# Patient Record
Sex: FEMALE | Race: BLACK OR AFRICAN AMERICAN | Employment: OTHER | URBAN - METROPOLITAN AREA
[De-identification: names, ages, dates, MRNs, and addresses within clinical notes are randomized per-mention and may not be internally consistent; named-entity substitution may affect disease eponyms.]

---

## 2017-01-19 ENCOUNTER — OFFICE VISIT (OUTPATIENT)
Dept: INTERNAL MEDICINE | Age: 77
End: 2017-01-19

## 2017-01-19 VITALS
DIASTOLIC BLOOD PRESSURE: 84 MMHG | BODY MASS INDEX: 35.26 KG/M2 | HEIGHT: 63 IN | WEIGHT: 199 LBS | SYSTOLIC BLOOD PRESSURE: 136 MMHG

## 2017-01-19 DIAGNOSIS — K21.00 REFLUX ESOPHAGITIS: ICD-10-CM

## 2017-01-19 DIAGNOSIS — I10 ESSENTIAL HYPERTENSION: ICD-10-CM

## 2017-01-19 DIAGNOSIS — E11.9 TYPE 2 DIABETES MELLITUS WITHOUT COMPLICATION, WITHOUT LONG-TERM CURRENT USE OF INSULIN (HCC): Primary | ICD-10-CM

## 2017-01-19 DIAGNOSIS — E78.00 PURE HYPERCHOLESTEROLEMIA: ICD-10-CM

## 2017-01-19 PROCEDURE — 99213 OFFICE O/P EST LOW 20 MIN: CPT | Performed by: INTERNAL MEDICINE

## 2017-01-19 RX ORDER — METFORMIN HYDROCHLORIDE 500 MG/1
TABLET, EXTENDED RELEASE ORAL
Qty: 60 TABLET | Refills: 5 | Status: SHIPPED | OUTPATIENT
Start: 2017-01-19 | End: 2018-01-26 | Stop reason: SDUPTHER

## 2017-01-20 LAB
A/G RATIO: 1.7 (ref 1.1–2.2)
ALBUMIN SERPL-MCNC: 4.4 G/DL (ref 3.4–5)
ALP BLD-CCNC: 102 U/L (ref 40–129)
ALT SERPL-CCNC: 12 U/L (ref 10–40)
ANION GAP SERPL CALCULATED.3IONS-SCNC: 13 MMOL/L (ref 3–16)
AST SERPL-CCNC: 12 U/L (ref 15–37)
BASOPHILS ABSOLUTE: 0 K/UL (ref 0–0.2)
BASOPHILS RELATIVE PERCENT: 0.3 %
BILIRUB SERPL-MCNC: 0.4 MG/DL (ref 0–1)
BUN BLDV-MCNC: 17 MG/DL (ref 7–20)
CALCIUM SERPL-MCNC: 10.2 MG/DL (ref 8.3–10.6)
CHLORIDE BLD-SCNC: 101 MMOL/L (ref 99–110)
CHOLESTEROL, TOTAL: 178 MG/DL (ref 0–199)
CO2: 31 MMOL/L (ref 21–32)
CREAT SERPL-MCNC: 0.7 MG/DL (ref 0.6–1.2)
EOSINOPHILS ABSOLUTE: 0.2 K/UL (ref 0–0.6)
EOSINOPHILS RELATIVE PERCENT: 3 %
GFR AFRICAN AMERICAN: >60
GFR NON-AFRICAN AMERICAN: >60
GLOBULIN: 2.6 G/DL
GLUCOSE BLD-MCNC: 140 MG/DL (ref 70–99)
HCT VFR BLD CALC: 43.2 % (ref 36–48)
HDLC SERPL-MCNC: 71 MG/DL (ref 40–60)
HEMOGLOBIN: 14.1 G/DL (ref 12–16)
LDL CHOLESTEROL CALCULATED: 96 MG/DL
LYMPHOCYTES ABSOLUTE: 2.2 K/UL (ref 1–5.1)
LYMPHOCYTES RELATIVE PERCENT: 33.6 %
MCH RBC QN AUTO: 30.2 PG (ref 26–34)
MCHC RBC AUTO-ENTMCNC: 32.7 G/DL (ref 31–36)
MCV RBC AUTO: 92.4 FL (ref 80–100)
MONOCYTES ABSOLUTE: 0.6 K/UL (ref 0–1.3)
MONOCYTES RELATIVE PERCENT: 8.9 %
NEUTROPHILS ABSOLUTE: 3.6 K/UL (ref 1.7–7.7)
NEUTROPHILS RELATIVE PERCENT: 54.2 %
PDW BLD-RTO: 13.7 % (ref 12.4–15.4)
PLATELET # BLD: 242 K/UL (ref 135–450)
PMV BLD AUTO: 9 FL (ref 5–10.5)
POTASSIUM SERPL-SCNC: 4 MMOL/L (ref 3.5–5.1)
RBC # BLD: 4.68 M/UL (ref 4–5.2)
SODIUM BLD-SCNC: 145 MMOL/L (ref 136–145)
TOTAL PROTEIN: 7 G/DL (ref 6.4–8.2)
TRIGL SERPL-MCNC: 53 MG/DL (ref 0–150)
VLDLC SERPL CALC-MCNC: 11 MG/DL
WBC # BLD: 6.6 K/UL (ref 4–11)

## 2017-01-21 LAB
ESTIMATED AVERAGE GLUCOSE: 159.9 MG/DL
HBA1C MFR BLD: 7.2 %

## 2017-03-07 RX ORDER — AMLODIPINE VALSARTAN AND HYDROCHLOROTHIAZIDE 10; 320; 25 MG/1; MG/1; MG/1
TABLET, FILM COATED ORAL
Qty: 30 TABLET | Refills: 4 | Status: SHIPPED | OUTPATIENT
Start: 2017-03-07 | End: 2017-04-28 | Stop reason: SDUPTHER

## 2017-04-28 ENCOUNTER — OFFICE VISIT (OUTPATIENT)
Dept: INTERNAL MEDICINE | Age: 77
End: 2017-04-28

## 2017-04-28 VITALS
DIASTOLIC BLOOD PRESSURE: 88 MMHG | BODY MASS INDEX: 34.38 KG/M2 | WEIGHT: 194 LBS | SYSTOLIC BLOOD PRESSURE: 122 MMHG | HEIGHT: 63 IN

## 2017-04-28 DIAGNOSIS — I10 ESSENTIAL HYPERTENSION: ICD-10-CM

## 2017-04-28 DIAGNOSIS — J30.1 SEASONAL ALLERGIC RHINITIS DUE TO POLLEN: ICD-10-CM

## 2017-04-28 DIAGNOSIS — E11.9 TYPE 2 DIABETES MELLITUS WITHOUT COMPLICATION, WITHOUT LONG-TERM CURRENT USE OF INSULIN (HCC): ICD-10-CM

## 2017-04-28 DIAGNOSIS — Z01.818 PRE-OP EXAM: Primary | ICD-10-CM

## 2017-04-28 PROCEDURE — 93000 ELECTROCARDIOGRAM COMPLETE: CPT | Performed by: HOSPITALIST

## 2017-04-28 PROCEDURE — 99213 OFFICE O/P EST LOW 20 MIN: CPT | Performed by: HOSPITALIST

## 2017-04-28 RX ORDER — MULTIVIT-MIN/IRON/FOLIC ACID/K 18-600-40
1 CAPSULE ORAL DAILY
COMMUNITY
End: 2022-10-21 | Stop reason: SDUPTHER

## 2017-04-28 ASSESSMENT — ENCOUNTER SYMPTOMS
GASTROINTESTINAL NEGATIVE: 1
RESPIRATORY NEGATIVE: 1
BLURRED VISION: 1

## 2017-05-30 ENCOUNTER — TELEPHONE (OUTPATIENT)
Dept: INTERNAL MEDICINE | Age: 77
End: 2017-05-30

## 2017-05-30 RX ORDER — AMLODIPINE, VALSARTAN AND HYDROCHLOROTHIAZIDE 10; 320; 25 MG/1; MG/1; MG/1
TABLET ORAL
Qty: 7 TABLET | Refills: 0 | Status: SHIPPED | OUTPATIENT
Start: 2017-05-30 | End: 2017-06-06 | Stop reason: SDUPTHER

## 2017-06-02 ENCOUNTER — TELEPHONE (OUTPATIENT)
Dept: INTERNAL MEDICINE | Age: 77
End: 2017-06-02

## 2017-06-06 ENCOUNTER — TELEPHONE (OUTPATIENT)
Dept: INTERNAL MEDICINE | Age: 77
End: 2017-06-06

## 2017-06-06 RX ORDER — AMLODIPINE, VALSARTAN AND HYDROCHLOROTHIAZIDE 10; 320; 25 MG/1; MG/1; MG/1
TABLET ORAL
Qty: 7 TABLET | Refills: 0 | Status: SHIPPED | OUTPATIENT
Start: 2017-06-06 | End: 2017-06-16 | Stop reason: SDUPTHER

## 2017-06-14 ENCOUNTER — TELEPHONE (OUTPATIENT)
Dept: INTERNAL MEDICINE | Age: 77
End: 2017-06-14

## 2017-06-15 ENCOUNTER — TELEPHONE (OUTPATIENT)
Dept: INTERNAL MEDICINE | Age: 77
End: 2017-06-15

## 2017-06-16 RX ORDER — AMLODIPINE, VALSARTAN AND HYDROCHLOROTHIAZIDE 10; 320; 25 MG/1; MG/1; MG/1
TABLET ORAL
Qty: 30 TABLET | Refills: 0 | Status: SHIPPED | OUTPATIENT
Start: 2017-06-16 | End: 2017-11-07 | Stop reason: SDUPTHER

## 2017-06-19 ENCOUNTER — TELEPHONE (OUTPATIENT)
Dept: INTERNAL MEDICINE | Age: 77
End: 2017-06-19

## 2017-06-19 RX ORDER — AMLODIPINE, VALSARTAN AND HYDROCHLOROTHIAZIDE 10; 320; 25 MG/1; MG/1; MG/1
TABLET ORAL
Qty: 30 TABLET | Refills: 0 | Status: CANCELLED | OUTPATIENT
Start: 2017-06-19

## 2017-07-12 ENCOUNTER — OFFICE VISIT (OUTPATIENT)
Dept: INTERNAL MEDICINE | Age: 77
End: 2017-07-12

## 2017-07-12 VITALS
SYSTOLIC BLOOD PRESSURE: 130 MMHG | HEIGHT: 63 IN | DIASTOLIC BLOOD PRESSURE: 82 MMHG | BODY MASS INDEX: 34.91 KG/M2 | WEIGHT: 197 LBS

## 2017-07-12 DIAGNOSIS — E11.9 TYPE 2 DIABETES MELLITUS WITHOUT COMPLICATION, WITHOUT LONG-TERM CURRENT USE OF INSULIN (HCC): Primary | ICD-10-CM

## 2017-07-12 DIAGNOSIS — E78.00 PURE HYPERCHOLESTEROLEMIA: ICD-10-CM

## 2017-07-12 DIAGNOSIS — I10 ESSENTIAL HYPERTENSION: ICD-10-CM

## 2017-07-12 PROCEDURE — 99213 OFFICE O/P EST LOW 20 MIN: CPT | Performed by: INTERNAL MEDICINE

## 2017-07-17 DIAGNOSIS — I10 ESSENTIAL HYPERTENSION: ICD-10-CM

## 2017-07-17 DIAGNOSIS — E78.00 PURE HYPERCHOLESTEROLEMIA: ICD-10-CM

## 2017-07-17 LAB
A/G RATIO: 1.4 (ref 1.1–2.2)
ALBUMIN SERPL-MCNC: 4.1 G/DL (ref 3.4–5)
ALP BLD-CCNC: 100 U/L (ref 40–129)
ALT SERPL-CCNC: 12 U/L (ref 10–40)
ANION GAP SERPL CALCULATED.3IONS-SCNC: 11 MMOL/L (ref 3–16)
AST SERPL-CCNC: 14 U/L (ref 15–37)
BASOPHILS ABSOLUTE: 0 K/UL (ref 0–0.2)
BASOPHILS RELATIVE PERCENT: 0.6 %
BILIRUB SERPL-MCNC: 0.4 MG/DL (ref 0–1)
BUN BLDV-MCNC: 16 MG/DL (ref 7–20)
CALCIUM SERPL-MCNC: 10.2 MG/DL (ref 8.3–10.6)
CHLORIDE BLD-SCNC: 98 MMOL/L (ref 99–110)
CHOLESTEROL, TOTAL: 215 MG/DL (ref 0–199)
CO2: 31 MMOL/L (ref 21–32)
CREAT SERPL-MCNC: 0.6 MG/DL (ref 0.6–1.2)
EOSINOPHILS ABSOLUTE: 0.2 K/UL (ref 0–0.6)
EOSINOPHILS RELATIVE PERCENT: 3.1 %
GFR AFRICAN AMERICAN: >60
GFR NON-AFRICAN AMERICAN: >60
GLOBULIN: 2.9 G/DL
GLUCOSE BLD-MCNC: 121 MG/DL (ref 70–99)
HCT VFR BLD CALC: 43.2 % (ref 36–48)
HDLC SERPL-MCNC: 77 MG/DL (ref 40–60)
HEMOGLOBIN: 14.2 G/DL (ref 12–16)
LDL CHOLESTEROL CALCULATED: 125 MG/DL
LYMPHOCYTES ABSOLUTE: 2.4 K/UL (ref 1–5.1)
LYMPHOCYTES RELATIVE PERCENT: 42.6 %
MCH RBC QN AUTO: 31 PG (ref 26–34)
MCHC RBC AUTO-ENTMCNC: 32.9 G/DL (ref 31–36)
MCV RBC AUTO: 94.4 FL (ref 80–100)
MONOCYTES ABSOLUTE: 0.5 K/UL (ref 0–1.3)
MONOCYTES RELATIVE PERCENT: 9.2 %
NEUTROPHILS ABSOLUTE: 2.5 K/UL (ref 1.7–7.7)
NEUTROPHILS RELATIVE PERCENT: 44.5 %
PDW BLD-RTO: 13.6 % (ref 12.4–15.4)
PLATELET # BLD: 251 K/UL (ref 135–450)
PMV BLD AUTO: 8.9 FL (ref 5–10.5)
POTASSIUM SERPL-SCNC: 4 MMOL/L (ref 3.5–5.1)
RBC # BLD: 4.58 M/UL (ref 4–5.2)
SODIUM BLD-SCNC: 140 MMOL/L (ref 136–145)
TOTAL PROTEIN: 7 G/DL (ref 6.4–8.2)
TRIGL SERPL-MCNC: 65 MG/DL (ref 0–150)
VLDLC SERPL CALC-MCNC: 13 MG/DL
WBC # BLD: 5.7 K/UL (ref 4–11)

## 2017-07-18 LAB
ESTIMATED AVERAGE GLUCOSE: 145.6 MG/DL
HBA1C MFR BLD: 6.7 %

## 2017-07-18 RX ORDER — ATORVASTATIN CALCIUM 40 MG/1
TABLET, FILM COATED ORAL
Qty: 30 TABLET | Refills: 4 | Status: SHIPPED | OUTPATIENT
Start: 2017-07-18 | End: 2018-04-20 | Stop reason: SDUPTHER

## 2017-07-25 DIAGNOSIS — I10 UNSPECIFIED ESSENTIAL HYPERTENSION: ICD-10-CM

## 2017-07-25 RX ORDER — FLUTICASONE PROPIONATE 50 MCG
2 SPRAY, SUSPENSION (ML) NASAL DAILY
Qty: 1 BOTTLE | Refills: 3 | Status: SHIPPED | OUTPATIENT
Start: 2017-07-25 | End: 2018-09-08 | Stop reason: SDUPTHER

## 2017-08-21 RX ORDER — IBUPROFEN 800 MG/1
800 TABLET ORAL EVERY 6 HOURS PRN
Qty: 120 TABLET | Refills: 0 | Status: SHIPPED | OUTPATIENT
Start: 2017-08-21 | End: 2018-07-09 | Stop reason: SDUPTHER

## 2017-09-11 DIAGNOSIS — J30.9 ALLERGIC RHINITIS: ICD-10-CM

## 2017-09-11 DIAGNOSIS — E78.5 OTHER AND UNSPECIFIED HYPERLIPIDEMIA: ICD-10-CM

## 2017-09-11 DIAGNOSIS — I10 UNSPECIFIED ESSENTIAL HYPERTENSION: ICD-10-CM

## 2017-09-11 RX ORDER — RANITIDINE 150 MG/1
150 TABLET ORAL 2 TIMES DAILY
Qty: 60 TABLET | Refills: 5 | Status: SHIPPED | OUTPATIENT
Start: 2017-09-11 | End: 2018-06-08 | Stop reason: SDUPTHER

## 2017-11-08 RX ORDER — AMLODIPINE VALSARTAN AND HYDROCHLOROTHIAZIDE 10; 320; 25 MG/1; MG/1; MG/1
TABLET, FILM COATED ORAL
Qty: 30 TABLET | Refills: 5 | Status: SHIPPED | OUTPATIENT
Start: 2017-11-08 | End: 2018-06-18 | Stop reason: SDUPTHER

## 2018-01-26 ENCOUNTER — OFFICE VISIT (OUTPATIENT)
Dept: INTERNAL MEDICINE | Age: 78
End: 2018-01-26

## 2018-01-26 VITALS
OXYGEN SATURATION: 94 % | WEIGHT: 198 LBS | TEMPERATURE: 100.7 F | SYSTOLIC BLOOD PRESSURE: 120 MMHG | DIASTOLIC BLOOD PRESSURE: 86 MMHG | HEIGHT: 63 IN | BODY MASS INDEX: 35.08 KG/M2

## 2018-01-26 DIAGNOSIS — I49.8 BIGEMINY: ICD-10-CM

## 2018-01-26 DIAGNOSIS — E11.9 TYPE 2 DIABETES MELLITUS WITHOUT COMPLICATION, WITHOUT LONG-TERM CURRENT USE OF INSULIN (HCC): Primary | ICD-10-CM

## 2018-01-26 DIAGNOSIS — J10.1 INFLUENZA A: ICD-10-CM

## 2018-01-26 DIAGNOSIS — R01.1 HEART MURMUR: ICD-10-CM

## 2018-01-26 LAB
INFLUENZA A ANTIBODY: POSITIVE
INFLUENZA B ANTIBODY: NEGATIVE

## 2018-01-26 PROCEDURE — 99214 OFFICE O/P EST MOD 30 MIN: CPT | Performed by: INTERNAL MEDICINE

## 2018-01-26 PROCEDURE — 93000 ELECTROCARDIOGRAM COMPLETE: CPT | Performed by: INTERNAL MEDICINE

## 2018-01-26 PROCEDURE — 87804 INFLUENZA ASSAY W/OPTIC: CPT | Performed by: INTERNAL MEDICINE

## 2018-01-26 RX ORDER — PROMETHAZINE HYDROCHLORIDE AND PHENYLEPHRINE HYDROCHLORIDE 6.25; 5 MG/5ML; MG/5ML
5 SYRUP ORAL EVERY 6 HOURS
Qty: 120 ML | Refills: 0 | Status: SHIPPED | OUTPATIENT
Start: 2018-01-26 | End: 2018-01-26 | Stop reason: SDUPTHER

## 2018-01-26 RX ORDER — METFORMIN HYDROCHLORIDE 500 MG/1
TABLET, EXTENDED RELEASE ORAL
Qty: 60 TABLET | Refills: 5 | Status: SHIPPED | OUTPATIENT
Start: 2018-01-26 | End: 2018-01-26 | Stop reason: SDUPTHER

## 2018-01-26 RX ORDER — PROMETHAZINE HYDROCHLORIDE AND PHENYLEPHRINE HYDROCHLORIDE 6.25; 5 MG/5ML; MG/5ML
5 SYRUP ORAL EVERY 6 HOURS
Qty: 120 ML | Refills: 0 | Status: SHIPPED | OUTPATIENT
Start: 2018-01-26 | End: 2019-01-07 | Stop reason: CLARIF

## 2018-01-26 RX ORDER — OSELTAMIVIR PHOSPHATE 75 MG/1
75 CAPSULE ORAL 2 TIMES DAILY
Qty: 10 CAPSULE | Refills: 0 | Status: SHIPPED | OUTPATIENT
Start: 2018-01-26 | End: 2018-01-31

## 2018-01-26 RX ORDER — METFORMIN HYDROCHLORIDE 500 MG/1
TABLET, EXTENDED RELEASE ORAL
Qty: 60 TABLET | Refills: 5 | Status: SHIPPED | OUTPATIENT
Start: 2018-01-26 | End: 2019-08-20 | Stop reason: SDUPTHER

## 2018-01-26 NOTE — PROGRESS NOTES
HPI: Patient presented with influenza type symptoms for about 2 days she's been having fevers myalgias and cough which is slightly productive she had a history of slightly productive cough but is gotten worse blood sugars are been well controlled she denies any chest pain or any other problems    Review of Systems: Low-grade temperature lungs no increasing shortness of breath or chest pain heart no chest pain PND or orthopnea abdominal no nausea vomiting or sore blood from rectum    Vital Signs: /86   Temp 100.7 °F (38.2 °C)   Ht 5' 3\" (1.6 m)   Wt 198 lb (89.8 kg)   SpO2 94%   BMI 35.07 kg/m²   General: Patient appears  non-toxic  HENT: Atraumatic, normocephalic, oral mucosa moist erythema the nares  Lungs:  Clear bilaterally  Heart: Regular rate and irregular rhythm  Abdomen: Non-distended, soft, non-tender  Extremities: No edema  Neuro: Nonfocal    Medical Decision Making and Plan:  Pertinent Labs & Imaging studies reviewed. (See chart for details)  Rapid flu is positive for influenza A    1. Type 2 diabetes mellitus without complication, without long-term current use of insulin ([address]  This problem is stable at present continue present meds    2. Heart murmur  Now with intermittent bigeminal rhythm which resolved   - EKG 12 Lead    3. Influenza A  Treatment with Tamiflu b.i.d.  - POCT Influenza A/B    4.  Bigeminy  Is resolved

## 2018-02-05 DIAGNOSIS — J10.1 INFLUENZA A: ICD-10-CM

## 2018-02-05 DIAGNOSIS — R01.1 HEART MURMUR: ICD-10-CM

## 2018-02-05 DIAGNOSIS — I49.8 BIGEMINY: ICD-10-CM

## 2018-02-05 DIAGNOSIS — E11.9 TYPE 2 DIABETES MELLITUS WITHOUT COMPLICATION, WITHOUT LONG-TERM CURRENT USE OF INSULIN (HCC): ICD-10-CM

## 2018-02-05 LAB
A/G RATIO: 1.6 (ref 1.1–2.2)
ALBUMIN SERPL-MCNC: 4.6 G/DL (ref 3.4–5)
ALP BLD-CCNC: 90 U/L (ref 40–129)
ALT SERPL-CCNC: 17 U/L (ref 10–40)
ANION GAP SERPL CALCULATED.3IONS-SCNC: 17 MMOL/L (ref 3–16)
AST SERPL-CCNC: 20 U/L (ref 15–37)
BASOPHILS ABSOLUTE: 0.1 K/UL (ref 0–0.2)
BASOPHILS RELATIVE PERCENT: 0.7 %
BILIRUB SERPL-MCNC: 0.5 MG/DL (ref 0–1)
BILIRUBIN URINE: NEGATIVE
BLOOD, URINE: NEGATIVE
BUN BLDV-MCNC: 18 MG/DL (ref 7–20)
CALCIUM SERPL-MCNC: 10.6 MG/DL (ref 8.3–10.6)
CHLORIDE BLD-SCNC: 97 MMOL/L (ref 99–110)
CHOLESTEROL, TOTAL: 168 MG/DL (ref 0–199)
CLARITY: ABNORMAL
CO2: 29 MMOL/L (ref 21–32)
COLOR: YELLOW
CREAT SERPL-MCNC: 0.7 MG/DL (ref 0.6–1.2)
EOSINOPHILS ABSOLUTE: 0.1 K/UL (ref 0–0.6)
EOSINOPHILS RELATIVE PERCENT: 1.6 %
EPITHELIAL CELLS, UA: 7 /HPF (ref 0–5)
GFR AFRICAN AMERICAN: >60
GFR NON-AFRICAN AMERICAN: >60
GLOBULIN: 2.9 G/DL
GLUCOSE BLD-MCNC: 131 MG/DL (ref 70–99)
GLUCOSE URINE: NEGATIVE MG/DL
HCT VFR BLD CALC: 43.8 % (ref 36–48)
HDLC SERPL-MCNC: 63 MG/DL (ref 40–60)
HEMOGLOBIN: 14.3 G/DL (ref 12–16)
HYALINE CASTS: 4 /LPF (ref 0–8)
KETONES, URINE: NEGATIVE MG/DL
LDL CHOLESTEROL CALCULATED: 86 MG/DL
LEUKOCYTE ESTERASE, URINE: ABNORMAL
LYMPHOCYTES ABSOLUTE: 2.3 K/UL (ref 1–5.1)
LYMPHOCYTES RELATIVE PERCENT: 29.9 %
MCH RBC QN AUTO: 30.4 PG (ref 26–34)
MCHC RBC AUTO-ENTMCNC: 32.7 G/DL (ref 31–36)
MCV RBC AUTO: 93.1 FL (ref 80–100)
MICROSCOPIC EXAMINATION: YES
MONOCYTES ABSOLUTE: 0.6 K/UL (ref 0–1.3)
MONOCYTES RELATIVE PERCENT: 7.8 %
NEUTROPHILS ABSOLUTE: 4.5 K/UL (ref 1.7–7.7)
NEUTROPHILS RELATIVE PERCENT: 60 %
NITRITE, URINE: NEGATIVE
PDW BLD-RTO: 13.9 % (ref 12.4–15.4)
PH UA: 6
PLATELET # BLD: 408 K/UL (ref 135–450)
PMV BLD AUTO: 8.7 FL (ref 5–10.5)
POTASSIUM SERPL-SCNC: 4.1 MMOL/L (ref 3.5–5.1)
PROTEIN UA: ABNORMAL MG/DL
RBC # BLD: 4.71 M/UL (ref 4–5.2)
RBC UA: 3 /HPF (ref 0–4)
SODIUM BLD-SCNC: 143 MMOL/L (ref 136–145)
SPECIFIC GRAVITY UA: 1.02
TOTAL PROTEIN: 7.5 G/DL (ref 6.4–8.2)
TRIGL SERPL-MCNC: 93 MG/DL (ref 0–150)
TSH SERPL DL<=0.05 MIU/L-ACNC: 3.18 UIU/ML (ref 0.27–4.2)
URINE TYPE: ABNORMAL
UROBILINOGEN, URINE: 1 E.U./DL
VLDLC SERPL CALC-MCNC: 19 MG/DL
WBC # BLD: 7.6 K/UL (ref 4–11)
WBC UA: 2 /HPF (ref 0–5)

## 2018-02-06 LAB
ESTIMATED AVERAGE GLUCOSE: 151.3 MG/DL
HBA1C MFR BLD: 6.9 %

## 2018-04-20 DIAGNOSIS — E78.00 PURE HYPERCHOLESTEROLEMIA: ICD-10-CM

## 2018-04-20 DIAGNOSIS — I10 ESSENTIAL HYPERTENSION: ICD-10-CM

## 2018-04-20 RX ORDER — ATORVASTATIN CALCIUM 40 MG/1
TABLET, FILM COATED ORAL
Qty: 30 TABLET | Refills: 3 | Status: SHIPPED | OUTPATIENT
Start: 2018-04-20 | End: 2019-08-20 | Stop reason: SDUPTHER

## 2018-06-08 DIAGNOSIS — I10 ESSENTIAL HYPERTENSION: ICD-10-CM

## 2018-06-08 DIAGNOSIS — J30.9 ALLERGIC RHINITIS: ICD-10-CM

## 2018-06-09 RX ORDER — RANITIDINE 150 MG/1
TABLET ORAL
Qty: 60 TABLET | Refills: 4 | Status: SHIPPED | OUTPATIENT
Start: 2018-06-09 | End: 2019-05-07 | Stop reason: SDUPTHER

## 2018-06-18 RX ORDER — AMLODIPINE, VALSARTAN AND HYDROCHLOROTHIAZIDE 10; 320; 25 MG/1; MG/1; MG/1
1 TABLET ORAL DAILY
Qty: 30 TABLET | Refills: 5 | Status: SHIPPED | OUTPATIENT
Start: 2018-06-18 | End: 2018-07-18

## 2018-06-18 RX ORDER — AMLODIPINE VALSARTAN AND HYDROCHLOROTHIAZIDE 10; 320; 25 MG/1; MG/1; MG/1
TABLET, FILM COATED ORAL
Qty: 30 TABLET | Refills: 4 | Status: SHIPPED | OUTPATIENT
Start: 2018-06-18 | End: 2019-02-11 | Stop reason: SDUPTHER

## 2018-06-25 ENCOUNTER — TELEPHONE (OUTPATIENT)
Dept: INTERNAL MEDICINE | Age: 78
End: 2018-06-25

## 2018-07-10 RX ORDER — IBUPROFEN 800 MG/1
800 TABLET ORAL EVERY 6 HOURS PRN
Qty: 120 TABLET | Refills: 0 | Status: SHIPPED | OUTPATIENT
Start: 2018-07-10 | End: 2020-02-29 | Stop reason: SDUPTHER

## 2018-07-17 ENCOUNTER — TELEPHONE (OUTPATIENT)
Dept: INTERNAL MEDICINE | Age: 78
End: 2018-07-17

## 2018-07-18 ENCOUNTER — TELEPHONE (OUTPATIENT)
Dept: INTERNAL MEDICINE | Age: 78
End: 2018-07-18

## 2018-07-18 RX ORDER — LOSARTAN POTASSIUM AND HYDROCHLOROTHIAZIDE 25; 100 MG/1; MG/1
1 TABLET ORAL DAILY
Qty: 90 TABLET | Refills: 1 | Status: SHIPPED | OUTPATIENT
Start: 2018-07-18 | End: 2019-01-07 | Stop reason: ALTCHOICE

## 2018-07-18 RX ORDER — AMLODIPINE BESYLATE 10 MG/1
10 TABLET ORAL DAILY
Qty: 90 TABLET | Refills: 1 | Status: SHIPPED | OUTPATIENT
Start: 2018-07-18 | End: 2020-01-13 | Stop reason: CLARIF

## 2018-07-18 NOTE — TELEPHONE ENCOUNTER
RECEIVED A FAX REGARDING A PA FOR EXFORGE HCT TABLETS.   FILLED OUT FORM AND FAXED IT BACK TO THE PLAN  STATUS: PENDING

## 2018-07-18 NOTE — TELEPHONE ENCOUNTER
Pharmacy states that [facility] HCT -25 MG TABS is $433. Pharmacy would like to get pt an alternative. Please call.

## 2018-09-07 DIAGNOSIS — I10 ESSENTIAL HYPERTENSION: ICD-10-CM

## 2018-09-08 RX ORDER — FLUTICASONE PROPIONATE 50 MCG
2 SPRAY, SUSPENSION (ML) NASAL DAILY
Qty: 1 BOTTLE | Refills: 3 | Status: SHIPPED | OUTPATIENT
Start: 2018-09-08 | End: 2019-12-16 | Stop reason: SDUPTHER

## 2018-09-10 RX ORDER — FLUTICASONE PROPIONATE 50 MCG
SPRAY, SUSPENSION (ML) NASAL
Qty: 1 BOTTLE | Refills: 2 | Status: SHIPPED | OUTPATIENT
Start: 2018-09-10 | End: 2019-01-07 | Stop reason: CLARIF

## 2019-01-07 ENCOUNTER — OFFICE VISIT (OUTPATIENT)
Dept: INTERNAL MEDICINE CLINIC | Age: 79
End: 2019-01-07
Payer: MEDICARE

## 2019-01-07 VITALS
BODY MASS INDEX: 35.44 KG/M2 | WEIGHT: 200 LBS | TEMPERATURE: 98.7 F | DIASTOLIC BLOOD PRESSURE: 80 MMHG | SYSTOLIC BLOOD PRESSURE: 120 MMHG | HEIGHT: 63 IN

## 2019-01-07 DIAGNOSIS — E11.9 TYPE 2 DIABETES MELLITUS WITHOUT COMPLICATION, WITHOUT LONG-TERM CURRENT USE OF INSULIN (HCC): ICD-10-CM

## 2019-01-07 DIAGNOSIS — I10 ESSENTIAL HYPERTENSION: ICD-10-CM

## 2019-01-07 DIAGNOSIS — K21.9 GASTROESOPHAGEAL REFLUX DISEASE WITHOUT ESOPHAGITIS: ICD-10-CM

## 2019-01-07 DIAGNOSIS — B30.9 VIRAL CONJUNCTIVITIS OF BOTH EYES: ICD-10-CM

## 2019-01-07 DIAGNOSIS — J06.9 ACUTE URI: Primary | ICD-10-CM

## 2019-01-07 LAB
A/G RATIO: 1.4 (ref 1.1–2.2)
ALBUMIN SERPL-MCNC: 4.2 G/DL (ref 3.4–5)
ALP BLD-CCNC: 97 U/L (ref 40–129)
ALT SERPL-CCNC: 9 U/L (ref 10–40)
ANION GAP SERPL CALCULATED.3IONS-SCNC: 15 MMOL/L (ref 3–16)
AST SERPL-CCNC: 10 U/L (ref 15–37)
BASOPHILS ABSOLUTE: 0.1 K/UL (ref 0–0.2)
BASOPHILS RELATIVE PERCENT: 0.7 %
BILIRUB SERPL-MCNC: 0.5 MG/DL (ref 0–1)
BUN BLDV-MCNC: 18 MG/DL (ref 7–20)
CALCIUM SERPL-MCNC: 10.4 MG/DL (ref 8.3–10.6)
CHLORIDE BLD-SCNC: 96 MMOL/L (ref 99–110)
CHOLESTEROL, TOTAL: 299 MG/DL (ref 0–199)
CO2: 32 MMOL/L (ref 21–32)
CREAT SERPL-MCNC: 0.8 MG/DL (ref 0.6–1.2)
EOSINOPHILS ABSOLUTE: 0 K/UL (ref 0–0.6)
EOSINOPHILS RELATIVE PERCENT: 0.6 %
GFR AFRICAN AMERICAN: >60
GFR NON-AFRICAN AMERICAN: >60
GLOBULIN: 3.1 G/DL
GLUCOSE BLD-MCNC: 161 MG/DL (ref 70–99)
HCT VFR BLD CALC: 44.5 % (ref 36–48)
HDLC SERPL-MCNC: 73 MG/DL (ref 40–60)
HEMOGLOBIN: 14.7 G/DL (ref 12–16)
LDL CHOLESTEROL CALCULATED: 209 MG/DL
LYMPHOCYTES ABSOLUTE: 1.3 K/UL (ref 1–5.1)
LYMPHOCYTES RELATIVE PERCENT: 16.2 %
MCH RBC QN AUTO: 30.9 PG (ref 26–34)
MCHC RBC AUTO-ENTMCNC: 33 G/DL (ref 31–36)
MCV RBC AUTO: 93.8 FL (ref 80–100)
MONOCYTES ABSOLUTE: 0.9 K/UL (ref 0–1.3)
MONOCYTES RELATIVE PERCENT: 11.1 %
NEUTROPHILS ABSOLUTE: 5.6 K/UL (ref 1.7–7.7)
NEUTROPHILS RELATIVE PERCENT: 71.4 %
PDW BLD-RTO: 14.2 % (ref 12.4–15.4)
PLATELET # BLD: 291 K/UL (ref 135–450)
PMV BLD AUTO: 8.8 FL (ref 5–10.5)
POTASSIUM SERPL-SCNC: 4.1 MMOL/L (ref 3.5–5.1)
RBC # BLD: 4.75 M/UL (ref 4–5.2)
SODIUM BLD-SCNC: 143 MMOL/L (ref 136–145)
TOTAL PROTEIN: 7.3 G/DL (ref 6.4–8.2)
TRIGL SERPL-MCNC: 85 MG/DL (ref 0–150)
TSH SERPL DL<=0.05 MIU/L-ACNC: 1.92 UIU/ML (ref 0.27–4.2)
VLDLC SERPL CALC-MCNC: 17 MG/DL
WBC # BLD: 7.9 K/UL (ref 4–11)

## 2019-01-07 PROCEDURE — 99214 OFFICE O/P EST MOD 30 MIN: CPT | Performed by: HOSPITALIST

## 2019-01-07 ASSESSMENT — PATIENT HEALTH QUESTIONNAIRE - PHQ9
SUM OF ALL RESPONSES TO PHQ QUESTIONS 1-9: 0
2. FEELING DOWN, DEPRESSED OR HOPELESS: 0
2. FEELING DOWN, DEPRESSED OR HOPELESS: 0
SUM OF ALL RESPONSES TO PHQ9 QUESTIONS 1 & 2: 0
SUM OF ALL RESPONSES TO PHQ QUESTIONS 1-9: 0
SUM OF ALL RESPONSES TO PHQ QUESTIONS 1-9: 0
SUM OF ALL RESPONSES TO PHQ9 QUESTIONS 1 & 2: 0
1. LITTLE INTEREST OR PLEASURE IN DOING THINGS: 0
1. LITTLE INTEREST OR PLEASURE IN DOING THINGS: 0
SUM OF ALL RESPONSES TO PHQ QUESTIONS 1-9: 0

## 2019-01-08 LAB
ESTIMATED AVERAGE GLUCOSE: 162.8 MG/DL
HBA1C MFR BLD: 7.3 %

## 2019-02-11 RX ORDER — AMLODIPINE VALSARTAN AND HYDROCHLOROTHIAZIDE 10; 320; 25 MG/1; MG/1; MG/1
TABLET, FILM COATED ORAL
Qty: 30 TABLET | Refills: 4 | Status: SHIPPED | OUTPATIENT
Start: 2019-02-11 | End: 2019-08-20 | Stop reason: SDUPTHER

## 2019-05-07 DIAGNOSIS — J30.9 ALLERGIC RHINITIS: ICD-10-CM

## 2019-05-07 DIAGNOSIS — I10 ESSENTIAL HYPERTENSION: ICD-10-CM

## 2019-05-08 RX ORDER — RANITIDINE 150 MG/1
TABLET ORAL
Qty: 60 TABLET | Refills: 3 | Status: SHIPPED | OUTPATIENT
Start: 2019-05-08 | End: 2019-08-20 | Stop reason: SDUPTHER

## 2019-08-20 ENCOUNTER — OFFICE VISIT (OUTPATIENT)
Dept: INTERNAL MEDICINE CLINIC | Age: 79
End: 2019-08-20
Payer: MEDICARE

## 2019-08-20 VITALS
HEIGHT: 63 IN | SYSTOLIC BLOOD PRESSURE: 130 MMHG | WEIGHT: 202 LBS | DIASTOLIC BLOOD PRESSURE: 76 MMHG | BODY MASS INDEX: 35.79 KG/M2

## 2019-08-20 DIAGNOSIS — E11.9 TYPE 2 DIABETES MELLITUS WITHOUT COMPLICATION, WITHOUT LONG-TERM CURRENT USE OF INSULIN (HCC): ICD-10-CM

## 2019-08-20 DIAGNOSIS — K21.9 GASTROESOPHAGEAL REFLUX DISEASE WITHOUT ESOPHAGITIS: ICD-10-CM

## 2019-08-20 DIAGNOSIS — Z78.0 POST-MENOPAUSAL: ICD-10-CM

## 2019-08-20 DIAGNOSIS — I10 ESSENTIAL HYPERTENSION: Primary | ICD-10-CM

## 2019-08-20 DIAGNOSIS — E78.00 PURE HYPERCHOLESTEROLEMIA: ICD-10-CM

## 2019-08-20 PROCEDURE — G8510 SCR DEP NEG, NO PLAN REQD: HCPCS | Performed by: HOSPITALIST

## 2019-08-20 PROCEDURE — 99214 OFFICE O/P EST MOD 30 MIN: CPT | Performed by: HOSPITALIST

## 2019-08-20 PROCEDURE — 3288F FALL RISK ASSESSMENT DOCD: CPT | Performed by: HOSPITALIST

## 2019-08-20 RX ORDER — METFORMIN HYDROCHLORIDE 500 MG/1
TABLET, EXTENDED RELEASE ORAL
Qty: 60 TABLET | Refills: 5 | Status: SHIPPED | OUTPATIENT
Start: 2019-08-20 | End: 2022-10-21 | Stop reason: SDUPTHER

## 2019-08-20 RX ORDER — AMLODIPINE, VALSARTAN AND HYDROCHLOROTHIAZIDE 10; 320; 25 MG/1; MG/1; MG/1
TABLET ORAL
Qty: 30 TABLET | Refills: 4 | Status: SHIPPED | OUTPATIENT
Start: 2019-08-20 | End: 2019-08-21 | Stop reason: SDUPTHER

## 2019-08-20 RX ORDER — ATORVASTATIN CALCIUM 20 MG/1
20 TABLET, FILM COATED ORAL DAILY
Qty: 30 TABLET | Refills: 3 | Status: SHIPPED | OUTPATIENT
Start: 2019-08-20 | End: 2022-10-21 | Stop reason: SDUPTHER

## 2019-08-20 RX ORDER — RANITIDINE 150 MG/1
TABLET ORAL
Qty: 60 TABLET | Refills: 3 | Status: SHIPPED | OUTPATIENT
Start: 2019-08-20 | End: 2020-01-13

## 2019-08-20 ASSESSMENT — PATIENT HEALTH QUESTIONNAIRE - PHQ9
1. LITTLE INTEREST OR PLEASURE IN DOING THINGS: 0
SUM OF ALL RESPONSES TO PHQ QUESTIONS 1-9: 0
2. FEELING DOWN, DEPRESSED OR HOPELESS: 0
SUM OF ALL RESPONSES TO PHQ9 QUESTIONS 1 & 2: 0
SUM OF ALL RESPONSES TO PHQ QUESTIONS 1-9: 0

## 2019-08-20 NOTE — PROGRESS NOTES
Follow Up Visit Established Patient Visit    Patient:  Kyle Landa                                               : 1940  Age: 77 y.o. MRN: [id number]  Date : 2019    Kyle Landa is a 77 y.o. female who presents for : Follow-up appointment    Chief Complaint   Patient presents with    Hypertension     Checks her BP weekly; SBP usually under 145 mm Hg. She doesn't report any headache. No SOB nor CP. No heart palpitations. Doesn't adhere to low salt diet. Checks her blood glucose twice/ week. Last Hb A1c- 7.3% on 19  Not compliant with regular use of Metformin and Lipitor. Zantac controls symptoms of gastroesophageal reflux disease. The 10-year ASCVD risk score (Cathy Singh, et al., 2013) is: 57.9%    Values used to calculate the score:      Age: 77 years      Sex: Female      Is Non- : Yes      Diabetic: Yes      Tobacco smoker: No      Systolic Blood Pressure: 602 mmHg      Is BP treated: Yes      HDL Cholesterol: 73 mg/dL      Total Cholesterol: 299 mg/dL    Past Medical History:   Diagnosis Date    Arthropathy, unspecified, site unspecified 2010    Leiomyoma of uterus, unspecified 2010    Other and unspecified hyperlipidemia 2010    Reflux esophagitis 2010    Type 2 diabetes mellitus without complication ([facility]     Unspecified essential hypertension 2010       Past Surgical History:   Procedure Laterality Date     SECTION         Current Outpatient Medications   Medication Sig Dispense Refill    ranitidine (ZANTAC) 150 MG tablet TAKE ONE TABLET BY MOUTH TWICE A DAY 60 tablet 3    amLODIPine-valsartan-HCTZ (EXFORGE HCT) -25 MG TABS TAKE ONE TABLET BY MOUTH DAILY 30 tablet 4    atorvastatin (LIPITOR) 20 MG tablet Take 1 tablet by mouth daily 30 tablet 3    metFORMIN (GLUCOPHAGE XR) 500 MG extended release tablet one tablet by mouth twice daily.  60 tablet 5    fluticasone (FLONASE) 50 MCG/ACT nasal spray 2 sprays by Nasal route daily 1 Bottle 3    amLODIPine (NORVASC) 10 MG tablet Take 1 tablet by mouth daily 90 tablet 1    ibuprofen (ADVIL;MOTRIN) 800 MG tablet Take 1 tablet by mouth every 6 hours as needed for Pain 120 tablet 0    Cholecalciferol (VITAMIN D) 2000 UNITS CAPS capsule Take 1 capsule by mouth daily       No current facility-administered medications for this visit. /76 (Site: Left Upper Arm)   Ht 5' 3\" (1.6 m)   Wt 202 lb (91.6 kg)   BMI 35.78 kg/m²     Physical Exam   Physical Exam   Constitutional: She is oriented to person, place, and time. She appears well-developed and well-nourished. No distress. HENT:   Head: Normocephalic and atraumatic. Mouth/Throat: Oropharynx is clear and moist. No oropharyngeal exudate. Eyes: Pupils are equal, round, and reactive to light. Conjunctivae and EOM are normal. No scleral icterus. Neck: Normal range of motion. Neck supple. No JVD present. No thyromegaly present. Cardiovascular: Normal rate, regular rhythm, normal heart sounds and intact distal pulses. No murmur heard. Pulmonary/Chest: Effort normal and breath sounds normal. No respiratory distress. She has no wheezes. She exhibits no tenderness. Abdominal: Soft. Bowel sounds are normal. She exhibits no distension and no mass. There is no tenderness. There is no rebound and no guarding. No hernia. Musculoskeletal: Normal range of motion. She exhibits no edema, tenderness or deformity. Lymphadenopathy:     She has no cervical adenopathy. Neurological: She is alert and oriented to person, place, and time. She displays normal reflexes. No cranial nerve deficit or sensory deficit. Skin: Skin is warm. No rash noted. She is not diaphoretic. No erythema. Psychiatric: She has a normal mood and affect. Thought content normal.   Vitals reviewed. Assessment/ plan:     Vasu was seen today for hypertension.     Diagnoses and all orders for this visit:    Essential

## 2019-08-21 DIAGNOSIS — I10 ESSENTIAL HYPERTENSION: ICD-10-CM

## 2019-08-22 ENCOUNTER — TELEPHONE (OUTPATIENT)
Dept: INTERNAL MEDICINE CLINIC | Age: 79
End: 2019-08-22

## 2019-08-22 RX ORDER — AMLODIPINE, VALSARTAN AND HYDROCHLOROTHIAZIDE 10; 320; 25 MG/1; MG/1; MG/1
TABLET ORAL
Qty: 30 TABLET | Refills: 4 | Status: SHIPPED | OUTPATIENT
Start: 2019-08-22 | End: 2019-09-16 | Stop reason: SDUPTHER

## 2019-08-26 NOTE — TELEPHONE ENCOUNTER
I just attempted PA for Exforge with [employer]   Key: [id number], STATUS \"Drug is covered by current benefit plan. No further PA activity needed\". Please notify patient. Thank you.

## 2019-09-16 DIAGNOSIS — I10 ESSENTIAL HYPERTENSION: ICD-10-CM

## 2019-09-16 RX ORDER — AMLODIPINE, VALSARTAN AND HYDROCHLOROTHIAZIDE 10; 320; 25 MG/1; MG/1; MG/1
TABLET ORAL
Qty: 30 TABLET | Refills: 5 | Status: SHIPPED | OUTPATIENT
Start: 2019-09-16 | End: 2020-03-06 | Stop reason: SDUPTHER

## 2019-10-07 ENCOUNTER — PATIENT MESSAGE (OUTPATIENT)
Dept: INTERNAL MEDICINE CLINIC | Age: 79
End: 2019-10-07

## 2019-10-08 RX ORDER — GLUCOSAMINE HCL/CHONDROITIN SU 500-400 MG
CAPSULE ORAL
Qty: 100 STRIP | Refills: 0 | Status: SHIPPED | OUTPATIENT
Start: 2019-10-08 | End: 2019-10-09

## 2019-10-09 RX ORDER — GLUCOSAMINE HCL/CHONDROITIN SU 500-400 MG
CAPSULE ORAL
Qty: 200 STRIP | Refills: 0 | Status: SHIPPED | OUTPATIENT
Start: 2019-10-09 | End: 2020-01-13 | Stop reason: CLARIF

## 2019-12-16 RX ORDER — FLUTICASONE PROPIONATE 50 MCG
SPRAY, SUSPENSION (ML) NASAL
Qty: 1 BOTTLE | Refills: 2 | Status: SHIPPED | OUTPATIENT
Start: 2019-12-16 | End: 2020-05-11 | Stop reason: SDUPTHER

## 2020-01-13 ENCOUNTER — OFFICE VISIT (OUTPATIENT)
Dept: INTERNAL MEDICINE CLINIC | Age: 80
End: 2020-01-13
Payer: MEDICARE

## 2020-01-13 VITALS
BODY MASS INDEX: 35.26 KG/M2 | SYSTOLIC BLOOD PRESSURE: 124 MMHG | DIASTOLIC BLOOD PRESSURE: 80 MMHG | WEIGHT: 199 LBS | HEIGHT: 63 IN

## 2020-01-13 DIAGNOSIS — E11.9 TYPE 2 DIABETES MELLITUS WITHOUT COMPLICATION, WITHOUT LONG-TERM CURRENT USE OF INSULIN (HCC): ICD-10-CM

## 2020-01-13 DIAGNOSIS — E78.00 PURE HYPERCHOLESTEROLEMIA: ICD-10-CM

## 2020-01-13 LAB
A/G RATIO: 1.7 (ref 1.1–2.2)
ALBUMIN SERPL-MCNC: 4.4 G/DL (ref 3.4–5)
ALP BLD-CCNC: 85 U/L (ref 40–129)
ALT SERPL-CCNC: 11 U/L (ref 10–40)
ANION GAP SERPL CALCULATED.3IONS-SCNC: 16 MMOL/L (ref 3–16)
AST SERPL-CCNC: 13 U/L (ref 15–37)
BASOPHILS ABSOLUTE: 0 K/UL (ref 0–0.2)
BASOPHILS RELATIVE PERCENT: 0.6 %
BILIRUB SERPL-MCNC: 0.5 MG/DL (ref 0–1)
BUN BLDV-MCNC: 17 MG/DL (ref 7–20)
CALCIUM SERPL-MCNC: 10.2 MG/DL (ref 8.3–10.6)
CHLORIDE BLD-SCNC: 99 MMOL/L (ref 99–110)
CHOLESTEROL, TOTAL: 224 MG/DL (ref 0–199)
CO2: 26 MMOL/L (ref 21–32)
CREAT SERPL-MCNC: 0.6 MG/DL (ref 0.6–1.2)
EOSINOPHILS ABSOLUTE: 0.1 K/UL (ref 0–0.6)
EOSINOPHILS RELATIVE PERCENT: 1.2 %
GFR AFRICAN AMERICAN: >60
GFR NON-AFRICAN AMERICAN: >60
GLOBULIN: 2.6 G/DL
GLUCOSE BLD-MCNC: 155 MG/DL (ref 70–99)
HCT VFR BLD CALC: 42.1 % (ref 36–48)
HDLC SERPL-MCNC: 74 MG/DL (ref 40–60)
HEMOGLOBIN: 13.8 G/DL (ref 12–16)
LDL CHOLESTEROL CALCULATED: 133 MG/DL
LYMPHOCYTES ABSOLUTE: 1.7 K/UL (ref 1–5.1)
LYMPHOCYTES RELATIVE PERCENT: 33.3 %
MCH RBC QN AUTO: 30.3 PG (ref 26–34)
MCHC RBC AUTO-ENTMCNC: 32.8 G/DL (ref 31–36)
MCV RBC AUTO: 92.6 FL (ref 80–100)
MONOCYTES ABSOLUTE: 0.4 K/UL (ref 0–1.3)
MONOCYTES RELATIVE PERCENT: 7.3 %
NEUTROPHILS ABSOLUTE: 2.9 K/UL (ref 1.7–7.7)
NEUTROPHILS RELATIVE PERCENT: 57.6 %
PDW BLD-RTO: 15 % (ref 12.4–15.4)
PLATELET # BLD: 293 K/UL (ref 135–450)
PMV BLD AUTO: 8.9 FL (ref 5–10.5)
POTASSIUM SERPL-SCNC: 4 MMOL/L (ref 3.5–5.1)
RBC # BLD: 4.54 M/UL (ref 4–5.2)
SODIUM BLD-SCNC: 141 MMOL/L (ref 136–145)
TOTAL PROTEIN: 7 G/DL (ref 6.4–8.2)
TRIGL SERPL-MCNC: 84 MG/DL (ref 0–150)
VLDLC SERPL CALC-MCNC: 17 MG/DL
WBC # BLD: 5 K/UL (ref 4–11)

## 2020-01-13 PROCEDURE — G8510 SCR DEP NEG, NO PLAN REQD: HCPCS | Performed by: INTERNAL MEDICINE

## 2020-01-13 PROCEDURE — 99213 OFFICE O/P EST LOW 20 MIN: CPT | Performed by: INTERNAL MEDICINE

## 2020-01-13 RX ORDER — CHLORAL HYDRATE 500 MG
3000 CAPSULE ORAL 3 TIMES DAILY
COMMUNITY

## 2020-01-13 RX ORDER — FAMOTIDINE 20 MG/1
20 TABLET, FILM COATED ORAL 2 TIMES DAILY
Qty: 60 TABLET | Refills: 3 | Status: SHIPPED | OUTPATIENT
Start: 2020-01-13 | End: 2020-05-26

## 2020-01-13 ASSESSMENT — PATIENT HEALTH QUESTIONNAIRE - PHQ9
SUM OF ALL RESPONSES TO PHQ9 QUESTIONS 1 & 2: 0
SUM OF ALL RESPONSES TO PHQ QUESTIONS 1-9: 0
2. FEELING DOWN, DEPRESSED OR HOPELESS: 0
1. LITTLE INTEREST OR PLEASURE IN DOING THINGS: 0
SUM OF ALL RESPONSES TO PHQ QUESTIONS 1-9: 0

## 2020-01-13 NOTE — PROGRESS NOTES
Use    Smoking status: Never Smoker    Smokeless tobacco: Never Used   Substance and Sexual Activity    Alcohol use: Yes    Drug use: No    Sexual activity: None   Lifestyle    Physical activity:     Days per week: None     Minutes per session: None    Stress: None   Relationships    Social connections:     Talks on phone: None     Gets together: None     Attends Sabianism service: None     Active member of club or organization: None     Attends meetings of clubs or organizations: None     Relationship status: None    Intimate partner violence:     Fear of current or ex partner: None     Emotionally abused: None     Physically abused: None     Forced sexual activity: None   Other Topics Concern    None   Social History Narrative    None         Allergies:  Patient has no known allergies. Current Medications:    Prior to Admission medications    Medication Sig Start Date End Date Taking? Authorizing Provider   Flaxseed, Linseed, (FLAXSEED OIL PO) Take by mouth   Yes Historical Provider, MD   Omega-3 Fatty Acids (FISH OIL) 1000 MG CAPS Take 3,000 mg by mouth 3 times daily   Yes Historical Provider, MD   famotidine (PEPCID) 20 MG tablet Take 1 tablet by mouth 2 times daily 1/13/20  Yes Shelley Back MD   fluticasone [facility]) 50 MCG/ACT nasal spray SPRAY TWO SPRAYS IN [facility] NOSTRIL ONCE DAILY 12/16/19  Yes Shelley Back MD   amLODIPine-valsartan-HCTZ (EXFORGE HCT) -27 MG TABS TAKE ONE TABLET BY MOUTH DAILY 9/16/19  Yes Shelley Back MD   atorvastatin (LIPITOR) 20 MG tablet Take 1 tablet by mouth daily 8/20/19  Yes Pratima Robertson MD   metFORMIN (GLUCOPHAGE XR) 500 MG extended release tablet one tablet by mouth twice daily.  8/20/19  Yes Pratima Robertson MD   ibuprofen (ADVIL;MOTRIN) 800 MG tablet Take 1 tablet by mouth every 6 hours as needed for Pain 7/10/18  Yes Shelley Back MD   Cholecalciferol (VITAMIN D) 2000 UNITS CAPS capsule Take 1 capsule by mouth daily   Yes Historical Provider, MD       Physical

## 2020-01-14 LAB
ESTIMATED AVERAGE GLUCOSE: 159.9 MG/DL
HBA1C MFR BLD: 7.2 %

## 2020-01-20 ENCOUNTER — TELEPHONE (OUTPATIENT)
Dept: INTERNAL MEDICINE CLINIC | Age: 80
End: 2020-01-20

## 2020-01-20 RX ORDER — AZITHROMYCIN 250 MG/1
250 TABLET, FILM COATED ORAL SEE ADMIN INSTRUCTIONS
Qty: 6 TABLET | Refills: 0 | Status: SHIPPED | OUTPATIENT
Start: 2020-01-20 | End: 2020-01-25

## 2020-01-20 NOTE — TELEPHONE ENCOUNTER
Non-Urgent Medical Question     From  Vasu Bates \"Vasu Bates\" To  [id number] Practice Support Sent  1/20/2020 12:00 AM   I have a cough with green mucous. I don't    have a fever . No pain . Just cough in my chest .   Please advise if I need a Prescription or what to do. I was just there on the 13th for a check -up.    Thanks,   Vasu Bates

## 2020-03-03 RX ORDER — IBUPROFEN 800 MG/1
800 TABLET ORAL EVERY 6 HOURS PRN
Qty: 120 TABLET | Refills: 1 | Status: SHIPPED | OUTPATIENT
Start: 2020-03-03 | End: 2021-03-10

## 2020-03-06 RX ORDER — AMLODIPINE, VALSARTAN AND HYDROCHLOROTHIAZIDE 10; 320; 25 MG/1; MG/1; MG/1
TABLET ORAL
Qty: 90 TABLET | Refills: 0 | Status: SHIPPED | OUTPATIENT
Start: 2020-03-06 | End: 2020-05-18

## 2020-05-11 RX ORDER — FLUTICASONE PROPIONATE 50 MCG
2 SPRAY, SUSPENSION (ML) NASAL DAILY
Qty: 1 BOTTLE | Refills: 2 | Status: SHIPPED | OUTPATIENT
Start: 2020-05-11 | End: 2022-10-21 | Stop reason: SDUPTHER

## 2020-05-18 RX ORDER — AMLODIPINE, VALSARTAN AND HYDROCHLOROTHIAZIDE 10; 320; 25 MG/1; MG/1; MG/1
TABLET ORAL
Qty: 30 TABLET | Refills: 4 | Status: SHIPPED | OUTPATIENT
Start: 2020-05-18 | End: 2020-11-19 | Stop reason: SDUPTHER

## 2020-05-26 RX ORDER — FAMOTIDINE 20 MG/1
TABLET, FILM COATED ORAL
Qty: 60 TABLET | Refills: 2 | Status: SHIPPED | OUTPATIENT
Start: 2020-05-26 | End: 2020-09-09

## 2020-07-30 ENCOUNTER — OFFICE VISIT (OUTPATIENT)
Dept: INTERNAL MEDICINE CLINIC | Age: 80
End: 2020-07-30
Payer: MEDICARE

## 2020-07-30 VITALS
TEMPERATURE: 97.3 F | HEIGHT: 63 IN | DIASTOLIC BLOOD PRESSURE: 85 MMHG | WEIGHT: 201 LBS | SYSTOLIC BLOOD PRESSURE: 140 MMHG | BODY MASS INDEX: 35.61 KG/M2

## 2020-07-30 DIAGNOSIS — E11.9 TYPE 2 DIABETES MELLITUS WITHOUT COMPLICATION, WITHOUT LONG-TERM CURRENT USE OF INSULIN (HCC): ICD-10-CM

## 2020-07-30 DIAGNOSIS — I10 ESSENTIAL HYPERTENSION: ICD-10-CM

## 2020-07-30 LAB
ALBUMIN SERPL-MCNC: 4.5 G/DL (ref 3.4–5)
ANION GAP SERPL CALCULATED.3IONS-SCNC: 15 MMOL/L (ref 3–16)
BASOPHILS ABSOLUTE: 0.1 K/UL (ref 0–0.2)
BASOPHILS RELATIVE PERCENT: 1.3 %
BUN BLDV-MCNC: 21 MG/DL (ref 7–20)
CALCIUM SERPL-MCNC: 10.3 MG/DL (ref 8.3–10.6)
CHLORIDE BLD-SCNC: 99 MMOL/L (ref 99–110)
CO2: 27 MMOL/L (ref 21–32)
CREAT SERPL-MCNC: 0.8 MG/DL (ref 0.6–1.2)
EOSINOPHILS ABSOLUTE: 0.1 K/UL (ref 0–0.6)
EOSINOPHILS RELATIVE PERCENT: 0.9 %
GFR AFRICAN AMERICAN: >60
GFR NON-AFRICAN AMERICAN: >60
GLUCOSE BLD-MCNC: 180 MG/DL (ref 70–99)
HCT VFR BLD CALC: 45.1 % (ref 36–48)
HEMOGLOBIN: 14.9 G/DL (ref 12–16)
LYMPHOCYTES ABSOLUTE: 1.1 K/UL (ref 1–5.1)
LYMPHOCYTES RELATIVE PERCENT: 17.2 %
MCH RBC QN AUTO: 31 PG (ref 26–34)
MCHC RBC AUTO-ENTMCNC: 33 G/DL (ref 31–36)
MCV RBC AUTO: 93.8 FL (ref 80–100)
MONOCYTES ABSOLUTE: 0.2 K/UL (ref 0–1.3)
MONOCYTES RELATIVE PERCENT: 3.5 %
NEUTROPHILS ABSOLUTE: 4.8 K/UL (ref 1.7–7.7)
NEUTROPHILS RELATIVE PERCENT: 77.1 %
PDW BLD-RTO: 14.4 % (ref 12.4–15.4)
PHOSPHORUS: 3.1 MG/DL (ref 2.5–4.9)
PLATELET # BLD: 263 K/UL (ref 135–450)
PMV BLD AUTO: 9.1 FL (ref 5–10.5)
POTASSIUM SERPL-SCNC: 4.1 MMOL/L (ref 3.5–5.1)
RBC # BLD: 4.81 M/UL (ref 4–5.2)
SODIUM BLD-SCNC: 141 MMOL/L (ref 136–145)
WBC # BLD: 6.2 K/UL (ref 4–11)

## 2020-07-30 PROCEDURE — 3051F HG A1C>EQUAL 7.0%<8.0%: CPT | Performed by: INTERNAL MEDICINE

## 2020-07-30 PROCEDURE — 99213 OFFICE O/P EST LOW 20 MIN: CPT | Performed by: INTERNAL MEDICINE

## 2020-07-30 NOTE — ASSESSMENT & PLAN NOTE
BP elevated in clinic today to 150/94 as she did not take her BP med this morning  Otherwise stable  - Continue exforge

## 2020-07-30 NOTE — ASSESSMENT & PLAN NOTE
Has not been taking atorvastatin due to fear of side effects  - Counseled on side effects, risks and benefits, patient will resume medication

## 2020-07-30 NOTE — PROGRESS NOTES
None   Lifestyle    Physical activity     Days per week: None     Minutes per session: None    Stress: None   Relationships    Social connections     Talks on phone: None     Gets together: None     Attends Mu-ism service: None     Active member of club or organization: None     Attends meetings of clubs or organizations: None     Relationship status: None    Intimate partner violence     Fear of current or ex partner: None     Emotionally abused: None     Physically abused: None     Forced sexual activity: None   Other Topics Concern    None   Social History Narrative    None         Allergies:  Patient has no known allergies. Current Medications:    Prior to Admission medications    Medication Sig Start Date End Date Taking? Authorizing Provider   famotidine (PEPCID) 20 MG tablet TAKE ONE TABLET BY MOUTH TWICE A DAY 5/26/20  Yes Cee Loja MD   amLODIPine-valsartan-HCTZ [facility] HCT) -02 MG TABS TAKE ONE TABLET BY MOUTH DAILY 5/18/20  Yes Cee Loja MD   fluticasone [facility]) 50 MCG/ACT nasal spray 2 sprays by Nasal route daily 5/11/20  Yes Cee Loja MD   ibuprofen (ADVIL;MOTRIN) 800 MG tablet Take 1 tablet by mouth every 6 hours as needed for Pain 3/3/20  Yes Cee Loja MD   Flaxseed, Linseed, (FLAXSEED OIL PO) Take by mouth   Yes Historical Provider, MD   Omega-3 Fatty Acids (FISH OIL) 1000 MG CAPS Take 3,000 mg by mouth 3 times daily   Yes Historical Provider, MD   atorvastatin (LIPITOR) 20 MG tablet Take 1 tablet by mouth daily 8/20/19  Yes Blanka Vasquez MD   metFORMIN (GLUCOPHAGE XR) 500 MG extended release tablet one tablet by mouth twice daily.  8/20/19  Yes Blanka Vasquez MD   Cholecalciferol (VITAMIN D) 2000 UNITS CAPS capsule Take 1 capsule by mouth daily   Yes Historical Provider, MD       Physical Exam:  Vital Signs: BP (!) 150/94 (Site: Left Upper Arm, Position: Sitting, Cuff Size: Medium Adult)   Temp 97.3 °F (36.3 °C) (Oral)   Ht 5' 3\" (1.6 m)   Wt 201 lb (91.2 kg)   BMI 35.61 kg/m²   General: Patient appears  non-toxic  HENT: Atraumatic, normocephalic, oral mucosa moist  Lungs:  Clear bilaterally  Heart: Regular rate and rhythm  Abdomen: Non-distended, soft, non-tender  Extremities: No edema  Neuro: Nonfocal    Medical Decision Making and Plan:  Pertinent Labs & Imaging studies reviewed. (See chart for details)  Blood studies nonfasting pending    1. Type 2 diabetes mellitus without complication, without long-term current use of insulin (HCC)  Problem is stable check above labs continue present meds  - Hemoglobin A1C; Future  - RENAL FUNCTION PANEL; Future    2. Essential hypertension  Problem is stable continue present meds  - CBC Auto Differential; Future  - RENAL FUNCTION PANEL; Future    3.  Pure hypercholesterolemia  Problem stable continue present meds

## 2020-07-30 NOTE — PROGRESS NOTES
Follow Up Visit  Established Patient Visit    Patient:  Stella Hickman                                               : 1940  Age: [de-identified] y.o. MRN: <[id number]>  Date : 2020      CHIEF COMPLAINT: Stella Hickman is a [de-identified] y.o. female who presents for :  Follow up    Hypertension  Checks BP at home. BP runs in the 120s/90s. Forgot to take BP med this morning. Denies HA, CP, SOB, LE swelling  Type 2 diabetes mellitus  Checks BG periodically, glucose runs in the 130s. Denies polyuria, polydipsia. Walks about 20 minutes 3 times a week. Reports she has cut back on overall intake, non compliant with low carb diet. Chief Complaint   Patient presents with    Diabetes    Hypertension       Past Medical History:   Diagnosis Date    Arthropathy, unspecified, site unspecified 2010    Leiomyoma of uterus, unspecified 2010    Other and unspecified hyperlipidemia 2010    Reflux esophagitis 2010    [facility]     Unspecified essential hypertension 2010       Past Surgical History:   Procedure Laterality Date     SECTION         Current Outpatient Medications   Medication Sig Dispense Refill    famotidine (PEPCID) 20 MG tablet TAKE ONE TABLET BY MOUTH TWICE A DAY 60 tablet 2    amLODIPine-valsartan-HCTZ (EXFORGE HCT) -25 MG TABS TAKE ONE TABLET BY MOUTH DAILY 30 tablet 4    fluticasone (FLONASE) 50 MCG/ACT nasal spray 2 sprays by Nasal route daily 1 Bottle 2    ibuprofen (ADVIL;MOTRIN) 800 MG tablet Take 1 tablet by mouth every 6 hours as needed for Pain 120 tablet 1    Flaxseed, Linseed, (FLAXSEED OIL PO) Take by mouth      Omega-3 Fatty Acids (FISH OIL) 1000 MG CAPS Take 3,000 mg by mouth 3 times daily      atorvastatin (LIPITOR) 20 MG tablet Take 1 tablet by mouth daily 30 tablet 3    metFORMIN (GLUCOPHAGE XR) 500 MG extended release tablet one tablet by mouth twice daily.  60 tablet 5    Cholecalciferol (VITAMIN D) 2000 UNITS CAPS capsule Take 1 capsule by mouth daily       No current facility-administered medications for this visit. Physical Exam   BP (!) 150/94 (Site: Left Upper Arm, Position: Sitting, Cuff Size: Medium Adult)   Temp 97.3 °F (36.3 °C) (Oral)   Ht 5' 3\" (1.6 m)   Wt 201 lb (91.2 kg)   BMI 35.61 kg/m²     Physical Exam  Vitals signs reviewed. Constitutional:       General: She is not in acute distress. Appearance: She is not ill-appearing. Cardiovascular:      Rate and Rhythm: Regular rhythm. Tachycardia present. Heart sounds: Murmur (holosystolic murmur in aortic area) present. No friction rub. No gallop. Pulmonary:      Effort: Pulmonary effort is normal. No respiratory distress. Breath sounds: Normal breath sounds. No wheezing, rhonchi or rales. Musculoskeletal:      Right lower leg: No edema. Left lower leg: No edema. Assessment/ plan:     Type 2 diabetes mellitus without complication (HCC)  Continue metformin  Advised regular exercise and low carb diet    Essential hypertension  BP elevated in clinic today to 150/94 as she did not take her BP med this morning  Otherwise stable  - Continue exforge    Pure hypercholesterolemia  Has not been taking atorvastatin due to fear of side effects  - Counseled on side effects, risks and benefits, patient will resume medication      Devin Wong, [address]

## 2020-07-31 LAB
ESTIMATED AVERAGE GLUCOSE: 177.2 MG/DL
HBA1C MFR BLD: 7.8 %

## 2020-09-09 RX ORDER — FAMOTIDINE 20 MG/1
TABLET, FILM COATED ORAL
Qty: 180 TABLET | Refills: 1 | Status: SHIPPED | OUTPATIENT
Start: 2020-09-09 | End: 2021-04-06

## 2020-11-05 ENCOUNTER — TELEPHONE (OUTPATIENT)
Dept: INTERNAL MEDICINE CLINIC | Age: 80
End: 2020-11-05

## 2020-11-05 NOTE — TELEPHONE ENCOUNTER
Prescription Question     From   Vasu Bates \"Vasu Bates\"  To   [id number] Support  Sent   11/5/2020 12:08 PM    No fever, but coughing. Could I get something to relieve the coughing?    Thanks,   Kira Sloan so [employer] [phone]

## 2020-11-19 RX ORDER — AMLODIPINE, VALSARTAN AND HYDROCHLOROTHIAZIDE 10; 320; 25 MG/1; MG/1; MG/1
TABLET ORAL
Qty: 30 TABLET | Refills: 4 | Status: SHIPPED
Start: 2020-11-19 | End: 2021-05-26 | Stop reason: RX

## 2021-02-03 ENCOUNTER — TELEPHONE (OUTPATIENT)
Dept: INTERNAL MEDICINE CLINIC | Age: 81
End: 2021-02-03

## 2021-02-03 NOTE — TELEPHONE ENCOUNTER
Having stomach cramps and constipation. No fever. need something for the cramps. Small movement has mucous . Please advise if I can get a  prescription.

## 2021-03-10 RX ORDER — IBUPROFEN 800 MG/1
TABLET ORAL
Qty: 120 TABLET | Refills: 0 | Status: SHIPPED | OUTPATIENT
Start: 2021-03-10

## 2021-04-06 RX ORDER — FAMOTIDINE 20 MG/1
TABLET, FILM COATED ORAL
Qty: 180 TABLET | Refills: 0 | Status: SHIPPED | OUTPATIENT
Start: 2021-04-06 | End: 2021-07-19

## 2021-05-24 NOTE — TELEPHONE ENCOUNTER
Patient called back in to let us know that amLODIPine-valsartan-HCTZ (EXFORGE HCT) -25 MG TABS     is unavailable because of a pharmacy back up and they can not get it in     Please call to advise  On what to do

## 2021-05-26 RX ORDER — VALSARTAN AND HYDROCHLOROTHIAZIDE 320; 12.5 MG/1; MG/1
1 TABLET, FILM COATED ORAL DAILY
Qty: 90 TABLET | Refills: 1 | Status: SHIPPED | OUTPATIENT
Start: 2021-05-26 | End: 2022-01-20

## 2021-05-26 RX ORDER — AMLODIPINE BESYLATE 10 MG/1
10 TABLET ORAL DAILY
Qty: 90 TABLET | Refills: 1 | Status: SHIPPED | OUTPATIENT
Start: 2021-05-26 | End: 2021-10-08

## 2021-05-26 NOTE — TELEPHONE ENCOUNTER
Vasu Bates \"Vasu Bates\"  Omaira Barragan MD 11 hours ago (9:27 PM)   MB  I was told by [facility] that Natasha Hodgkin is no longer available as the supplier has a long back order list  and perhaps it will return at a later date. Please advise as to what I need to do to get a new medication.     Thanks,  Vasu Bates     [phone]

## 2021-07-19 RX ORDER — FAMOTIDINE 20 MG/1
TABLET, FILM COATED ORAL
Qty: 180 TABLET | Refills: 0 | Status: SHIPPED | OUTPATIENT
Start: 2021-07-19 | End: 2021-10-26

## 2021-08-10 RX ORDER — BLOOD SUGAR DIAGNOSTIC
STRIP MISCELLANEOUS
Qty: 150 STRIP | Refills: 3 | Status: SHIPPED | OUTPATIENT
Start: 2021-08-10

## 2021-10-08 ENCOUNTER — VIRTUAL VISIT (OUTPATIENT)
Dept: INTERNAL MEDICINE CLINIC | Age: 81
End: 2021-10-08
Payer: MEDICARE

## 2021-10-08 ENCOUNTER — NURSE TRIAGE (OUTPATIENT)
Dept: OTHER | Facility: CLINIC | Age: 81
End: 2021-10-08

## 2021-10-08 DIAGNOSIS — R60.0 LOCALIZED EDEMA: ICD-10-CM

## 2021-10-08 DIAGNOSIS — I10 PRIMARY HYPERTENSION: Primary | ICD-10-CM

## 2021-10-08 PROCEDURE — 99213 OFFICE O/P EST LOW 20 MIN: CPT | Performed by: INTERNAL MEDICINE

## 2021-10-08 PROCEDURE — 3288F FALL RISK ASSESSMENT DOCD: CPT | Performed by: INTERNAL MEDICINE

## 2021-10-08 RX ORDER — AMLODIPINE BESYLATE 5 MG/1
5 TABLET ORAL DAILY
Qty: 30 TABLET | Refills: 3 | Status: SHIPPED | OUTPATIENT
Start: 2021-10-08 | End: 2022-03-08

## 2021-10-08 ASSESSMENT — PATIENT HEALTH QUESTIONNAIRE - PHQ9
SUM OF ALL RESPONSES TO PHQ9 QUESTIONS 1 & 2: 0
SUM OF ALL RESPONSES TO PHQ QUESTIONS 1-9: 0
SUM OF ALL RESPONSES TO PHQ QUESTIONS 1-9: 0
2. FEELING DOWN, DEPRESSED OR HOPELESS: 0
1. LITTLE INTEREST OR PLEASURE IN DOING THINGS: 0
SUM OF ALL RESPONSES TO PHQ QUESTIONS 1-9: 0

## 2021-10-08 NOTE — PROGRESS NOTES
10/8/2021    TELEHEALTH EVALUATION -- Audio/Visual (During SYXIX-86 public health emergency)    HPI: Swelling of lower extremities    Vasu Bates (:  1940) has requested an audio/video evaluation for the following concern(s):    Complains of swelling of lower extremities she is on Norvasc at 10 mg he denies any chest pain shortness of breath or any other problems    Review of Systems no fevers chills shortness of breath or chest pain  Prior to Visit Medications    Medication Sig Taking? Authorizing Provider   amLODIPine (NORVASC) 5 MG tablet Take 1 tablet by mouth daily Yes Jossue Virk MD   metFORMIN (GLUCOPHAGE XR) 500 MG extended release tablet one tablet by mouth twice daily. Yes Britni Hansen MD   blood glucose test strips (ONETOUCH ULTRA) strip USE ONE STRIP TO TEST TWICE A DAY AS DIRECTED  Jossue Virk MD   famotidine (PEPCID) 20 MG tablet TAKE ONE TABLET BY MOUTH TWICE A DAY  Jossue Virk MD   valsartan-hydroCHLOROthiazide (DIOVAN-HCT) 320-12.5 MG per tablet Take 1 tablet by mouth daily  Jossue Virk MD   ibuprofen (ADVIL;MOTRIN) 800 MG tablet TAKE ONE TABLET BY MOUTH EVERY 6 HOURS AS NEEDED FOR PAIN  Jossue Virk MD   fluticasone (FLONASE) 50 MCG/ACT nasal spray 2 sprays by Nasal route daily  Jossue Virk MD   Flaxseed, Linseed, (FLAXSEED OIL PO) Take by mouth  Historical Provider, MD   Omega-3 Fatty Acids (FISH OIL) 1000 MG CAPS Take 3,000 mg by mouth 3 times daily  Historical Provider, MD   atorvastatin (LIPITOR) 20 MG tablet Take 1 tablet by mouth daily  Patient not taking: Reported on 10/8/2021  Britni Hansen MD   Cholecalciferol (VITAMIN D) 2000 UNITS CAPS capsule Take 1 capsule by mouth daily  Historical Provider, MD       Social History     Tobacco Use    Smoking status: Never Smoker    Smokeless tobacco: Never Used   Substance Use Topics    Alcohol use:  Yes    Drug use: No        Past Medical History:   Diagnosis Date    Arthropathy, unspecified, site unspecified 2010    Leiomyoma of uterus, unspecified 7/14/2010    Other and unspecified hyperlipidemia 7/14/2010    Reflux esophagitis 7/14/2010    [facility] 70/5/0745    Unspecified essential hypertension 7/14/2010       PHYSICAL EXAMINATION:  [ INSTRUCTIONS:  \"[x]\" Indicates a positive item  \"[]\" Indicates a negative item  -- DELETE ALL ITEMS NOT EXAMINED]  Vital Signs: (As obtained by patient/caregiver or practitioner observation)    Blood pressure-  Heart rate-    Respiratory rate-    Temperature-  Pulse oximetry-     Constitutional: [] Appears well-developed and well-nourished [] No apparent distress      [] Abnormal-   Mental status  [] Alert and awake  [] Oriented to person/place/time []Able to follow commands      Eyes:  EOM    []  Normal  [] Abnormal-  Sclera  []  Normal  [] Abnormal -         Discharge []  None visible  [] Abnormal -    HENT:   [x] Normocephalic, atraumatic.   [x] Abnormal   [] Mouth/Throat: Mucous membranes are moist.     External Ears [] Normal  [] Abnormal-     Neck: [] No visualized mass     Pulmonary/Chest: [x] Respiratory effort normal.  [x] No visualized signs of difficulty breathing or respiratory distress        [] Abnormal-      Musculoskeletal:   [] Normal gait with no signs of ataxia         [] Normal range of motion of neck        [] Abnormal-       Neurological:        [] No Facial Asymmetry (Cranial nerve 7 motor function) (limited exam to video visit)          [] No gaze palsy        [] Abnormal-         Skin:        [] No significant exanthematous lesions or discoloration noted on facial skin         [] Abnormal-            Psychiatric:       [x] Normal Affect [] No Hallucinations        [] Abnormal-     Other pertinent observable physical exam findings-     ASSESSMENT/PLAN:  Edema of the lower extremities probably secondary Norvasc will decrease Norvasc from 10-5 and she is to call in 2 weeks if it does not get better    No follow-ups on file.    Genesis Coffman, was evaluated through a synchronous (real-time) audio-video encounter. The patient (or guardian if applicable) is aware that this is a billable service. Verbal consent to proceed has been obtained within the past 12 months. The visit was conducted pursuant to the emergency declaration under the [address] [place] [address] and the [employer] Act. Patient identification was verified, and a caregiver was present when appropriate. The patient was located in a state where the provider was credentialed to provide care. Total time spent on this encounter: Not billed by time    --Lili Prieto MD on 10/8/2021 at 2:45 PM    An electronic signature was used to authenticate this note.

## 2021-10-08 NOTE — TELEPHONE ENCOUNTER
Received call from Rafael at [facility] with Red Flag Complaint. Brief description of triage: Right foot swelling    Triage indicates for patient to see PCP within 3 days    Care advice provided, patient verbalizes understanding; denies any other questions or concerns; instructed to call back for any new or worsening symptoms. Writer provided warm transfer to [facility] for appointment scheduling. Attention Provider: Thank you for allowing me to participate in the care of your patient. The patient was connected to triage in response to information provided to the [facility]. Please do not respond through this encounter as the response is not directed to a shared pool. Reason for Disposition   Swollen ankle joint  (Exception: area of localized swelling which is itchy)    Answer Assessment - Initial Assessment Questions  1. LOCATION: \"Which joint is swollen? \"      Right foot, not very swollen    2. ONSET: \"When did the swelling start?\"      1 month    3. SIZE: \"How large is the swelling? \"      Not very large, but noticeable when compared to the left. .  4. PAIN: \"Is there any pain? \" If so, ask: \"How bad is it? \" (Scale 1-10; or mild, moderate, severe)      No pain    5. CAUSE: \"What do you think caused the swollen joint? \"      Unsure, new meds? 6. OTHER SYMPTOMS: \"Do you have any other symptoms? \" (e.g., fever, chest pain, difficulty breathing, calf pain)      No    7. PREGNANCY: \"Is there any chance you are pregnant? \" \"When was your last menstrual period? \"      N/a    Protocols used: ANKLE SWELLING-ADULT-

## 2021-10-26 RX ORDER — FAMOTIDINE 20 MG/1
TABLET, FILM COATED ORAL
Qty: 180 TABLET | Refills: 0 | Status: SHIPPED | OUTPATIENT
Start: 2021-10-26 | End: 2022-05-03

## 2022-01-10 ENCOUNTER — TELEPHONE (OUTPATIENT)
Dept: INTERNAL MEDICINE CLINIC | Age: 82
End: 2022-01-10

## 2022-01-10 NOTE — TELEPHONE ENCOUNTER
Advice on Covid Symptoms    Vasu Bates \"Vasu Bates\"  Katy Parra MD 2 days ago     MB      Have a cough. No fever. Little tightness in chest when about to cough. Taking coricidin and Robitussen DM  which seem to be working. Having trouble getting a covid test right away. Please advise if I need a prescription. Have had both shots . Booster due in March.     Thanks for your help  Vasu

## 2022-01-20 RX ORDER — VALSARTAN AND HYDROCHLOROTHIAZIDE 320; 12.5 MG/1; MG/1
TABLET, FILM COATED ORAL
Qty: 90 TABLET | Refills: 1 | Status: SHIPPED | OUTPATIENT
Start: 2022-01-20 | End: 2022-09-20

## 2022-03-08 RX ORDER — AMLODIPINE BESYLATE 5 MG/1
TABLET ORAL
Qty: 30 TABLET | Refills: 3 | Status: SHIPPED | OUTPATIENT
Start: 2022-03-08 | End: 2022-08-03

## 2022-05-03 RX ORDER — FAMOTIDINE 20 MG/1
TABLET, FILM COATED ORAL
Qty: 180 TABLET | Refills: 0 | Status: SHIPPED | OUTPATIENT
Start: 2022-05-03 | End: 2022-10-21 | Stop reason: SDUPTHER

## 2022-08-03 RX ORDER — AMLODIPINE BESYLATE 5 MG/1
TABLET ORAL
Qty: 90 TABLET | Refills: 1 | Status: SHIPPED | OUTPATIENT
Start: 2022-08-03 | End: 2022-10-21 | Stop reason: SDUPTHER

## 2022-09-20 RX ORDER — VALSARTAN AND HYDROCHLOROTHIAZIDE 320; 12.5 MG/1; MG/1
TABLET, FILM COATED ORAL
Qty: 90 TABLET | Refills: 1 | Status: SHIPPED | OUTPATIENT
Start: 2022-09-20 | End: 2022-10-21 | Stop reason: SDUPTHER

## 2022-10-03 ENCOUNTER — TELEPHONE (OUTPATIENT)
Dept: INTERNAL MEDICINE CLINIC | Age: 82
End: 2022-10-03

## 2022-10-03 NOTE — TELEPHONE ENCOUNTER
prescription  (Newest Message First)  Garret Bey \"Vasu Bates\"  [facility] (supporting Nicolas Draper MD) 17 hours ago (4:42 PM)     MB  My granddaughter Sakina White lives with me and I have developed flu like symptoms as she had. Her test   results were negative for covid. Dr. Ren Carter gave Her a prescription for Azithromycin  and she is ok now . Ask him if he will prescribe the same for me . I have an appointment for Oct 21-4:15.   Thanks,  Vasu Bates

## 2022-10-03 NOTE — TELEPHONE ENCOUNTER
is out of the office on vacation. We can schedule you for an office visit or virtual visit with another physician. Patient notified through my chart.

## 2022-10-04 ENCOUNTER — TELEPHONE (OUTPATIENT)
Dept: INTERNAL MEDICINE CLINIC | Age: 82
End: 2022-10-04

## 2022-10-04 ENCOUNTER — TELEMEDICINE (OUTPATIENT)
Dept: INTERNAL MEDICINE CLINIC | Age: 82
End: 2022-10-04
Payer: MEDICARE

## 2022-10-04 DIAGNOSIS — J40 BRONCHITIS: Primary | ICD-10-CM

## 2022-10-04 PROCEDURE — 1123F ACP DISCUSS/DSCN MKR DOCD: CPT | Performed by: STUDENT IN AN ORGANIZED HEALTH CARE EDUCATION/TRAINING PROGRAM

## 2022-10-04 PROCEDURE — 99213 OFFICE O/P EST LOW 20 MIN: CPT | Performed by: STUDENT IN AN ORGANIZED HEALTH CARE EDUCATION/TRAINING PROGRAM

## 2022-10-04 RX ORDER — AZITHROMYCIN 250 MG/1
250 TABLET, FILM COATED ORAL SEE ADMIN INSTRUCTIONS
Qty: 6 TABLET | Refills: 0 | Status: SHIPPED | OUTPATIENT
Start: 2022-10-04 | End: 2022-10-09

## 2022-10-04 SDOH — ECONOMIC STABILITY: FOOD INSECURITY: WITHIN THE PAST 12 MONTHS, YOU WORRIED THAT YOUR FOOD WOULD RUN OUT BEFORE YOU GOT MONEY TO BUY MORE.: NEVER TRUE

## 2022-10-04 SDOH — ECONOMIC STABILITY: FOOD INSECURITY: WITHIN THE PAST 12 MONTHS, THE FOOD YOU BOUGHT JUST DIDN'T LAST AND YOU DIDN'T HAVE MONEY TO GET MORE.: NEVER TRUE

## 2022-10-04 ASSESSMENT — PATIENT HEALTH QUESTIONNAIRE - PHQ9
SUM OF ALL RESPONSES TO PHQ QUESTIONS 1-9: 0
2. FEELING DOWN, DEPRESSED OR HOPELESS: 0
SUM OF ALL RESPONSES TO PHQ QUESTIONS 1-9: 0
SUM OF ALL RESPONSES TO PHQ QUESTIONS 1-9: 0
1. LITTLE INTEREST OR PLEASURE IN DOING THINGS: 0
SUM OF ALL RESPONSES TO PHQ QUESTIONS 1-9: 0
SUM OF ALL RESPONSES TO PHQ9 QUESTIONS 1 & 2: 0

## 2022-10-04 ASSESSMENT — ENCOUNTER SYMPTOMS
VOICE CHANGE: 0
STRIDOR: 0
PHOTOPHOBIA: 0
VOMITING: 0
CHEST TIGHTNESS: 0
CHOKING: 0
SHORTNESS OF BREATH: 0
NAUSEA: 0
ABDOMINAL DISTENTION: 0
WHEEZING: 0
TROUBLE SWALLOWING: 0
DIARRHEA: 0
ABDOMINAL PAIN: 0
APNEA: 0
COUGH: 1
CONSTIPATION: 0

## 2022-10-04 ASSESSMENT — SOCIAL DETERMINANTS OF HEALTH (SDOH): HOW HARD IS IT FOR YOU TO PAY FOR THE VERY BASICS LIKE FOOD, HOUSING, MEDICAL CARE, AND HEATING?: NOT HARD AT ALL

## 2022-10-04 NOTE — TELEPHONE ENCOUNTER
Flu like symptoms  (Newest Message First)  Vasu Bates \"Vasu Bates\"  [facility] (supporting Gila Cardoso, [place], [place]) 21 hours ago (12:40 PM)     MB  I have an appointment with Dr. Joelle Millan On Oct 21, just thought I could get a prescription for my cold. I usually see Dr. Young Narayan when  is not there . I can only do Virtual if he cannot give me a prescription .

## 2022-10-04 NOTE — PROGRESS NOTES
Vasu Bates (:  1940) is a Established patient, here for evaluation of the following:    Assessment & Plan   Below is the assessment and plan developed based on review of pertinent history, physical exam, labs, studies, and medications. 1. Bronchitis  Assessment & Plan:  Recommended COVID booster and flu vaccine once symptoms resolved  Orders:  -     azithromycin (ZITHROMAX) 250 MG tablet; Take 1 tablet by mouth See Admin Instructions for 5 days 500mg on day 1 followed by 250mg on days 2 - 5, Disp-6 tablet, R-0Normal    Return for Follow-up with Dr. Ayse Plasencia. Subjective   Cough  Pertinent negatives include no chest pain, chills, fever, rash, shortness of breath or wheezing. The patient presented today for virtual visit for recent upper respiratory infection. She believes that she got it from her friend who also has very similar symptoms with recent URI. The friend was treated with Z-Vahe with complete resolve of the symptoms. Patient reported symptoms started about 3 days ago, she has cough with green sputum. No fever or chills or body ache. She does feel like it is getting slightly better since onset. She is vaccinated against COVID. Review of Systems   Constitutional:  Negative for activity change, appetite change, chills, diaphoresis, fatigue, fever and unexpected weight change. HENT:  Negative for trouble swallowing and voice change. Eyes:  Negative for photophobia and visual disturbance. Respiratory:  Positive for cough. Negative for apnea, choking, chest tightness, shortness of breath, wheezing and stridor. Cardiovascular:  Negative for chest pain, palpitations and leg swelling. Gastrointestinal:  Negative for abdominal distention, abdominal pain, constipation, diarrhea, nausea and vomiting. Genitourinary:  Negative for difficulty urinating and dysuria. Skin:  Negative for rash and wound. Neurological:  Negative for dizziness, weakness and light-headedness. Psychiatric/Behavioral:  Negative for agitation and behavioral problems. Objective   Patient-Reported Vitals  Patient-Reported Temperature: 98.0  Patient-Reported Weight: 181lb         [INSTRUCTIONS:  \"[x]\" Indicates a positive item  \"[]\" Indicates a negative item  -- DELETE ALL ITEMS NOT EXAMINED]    Constitutional: [x] Appears well-developed and well-nourished [x] No apparent distress      [] Abnormal -     Mental status: [x] Alert and awake  [x] Oriented to person/place/time [x] Able to follow commands    [] Abnormal -     Eyes:   EOM    [x]  Normal    [] Abnormal -   Sclera  [x]  Normal    [] Abnormal -          Discharge [x]  None visible   [] Abnormal -     HENT: [x] Normocephalic, atraumatic  [] Abnormal -   [x] Mouth/Throat: Mucous membranes are moist    External Ears [x] Normal  [] Abnormal -    Neck: [x] No visualized mass [] Abnormal -     Pulmonary/Chest: [x] Respiratory effort normal   [x] No visualized signs of difficulty breathing or respiratory distress        [] Abnormal -      Musculoskeletal:   [x] Normal gait with no signs of ataxia         [x] Normal range of motion of neck        [] Abnormal -     Neurological:        [x] No Facial Asymmetry (Cranial nerve 7 motor function) (limited exam due to video visit)          [x] No gaze palsy        [] Abnormal -          Skin:        [x] No significant exanthematous lesions or discoloration noted on facial skin         [] Abnormal -            Psychiatric:       [x] Normal Affect [] Abnormal -        [x] No Hallucinations    Other pertinent observable physical exam findings:-         On this date 10/4/2022 I have spent 20 minutes reviewing previous notes, test results and face to face (virtual) with the patient discussing the diagnosis and importance of compliance with the treatment plan as well as documenting on the day of the visit. Vasu Bates, was evaluated through a synchronous (real-time) audio-video encounter.  The patient (or guardian if applicable) is aware that this is a billable service, which includes applicable co-pays. This Virtual Visit was conducted with patient's (and/or legal guardian's) consent. The visit was conducted pursuant to the emergency declaration under the [facility], [address] [place] [address] authority and the [employer] General Act. Patient identification was verified, and a caregiver was present when appropriate. The patient was located at Home: [address] [address]. Provider was located at [facility] ([facility]): [phone] [employer], [address] [place] [address].         --Kathleen Lopez MD

## 2022-10-21 ENCOUNTER — OFFICE VISIT (OUTPATIENT)
Dept: INTERNAL MEDICINE CLINIC | Age: 82
End: 2022-10-21
Payer: MEDICARE

## 2022-10-21 VITALS
HEART RATE: 96 BPM | DIASTOLIC BLOOD PRESSURE: 90 MMHG | SYSTOLIC BLOOD PRESSURE: 145 MMHG | WEIGHT: 182 LBS | BODY MASS INDEX: 32.24 KG/M2

## 2022-10-21 DIAGNOSIS — E55.9 VITAMIN D DEFICIENCY: ICD-10-CM

## 2022-10-21 DIAGNOSIS — I10 ESSENTIAL HYPERTENSION: ICD-10-CM

## 2022-10-21 DIAGNOSIS — J40 BRONCHITIS: Primary | ICD-10-CM

## 2022-10-21 DIAGNOSIS — E11.9 TYPE 2 DIABETES MELLITUS WITHOUT COMPLICATION, WITHOUT LONG-TERM CURRENT USE OF INSULIN (HCC): ICD-10-CM

## 2022-10-21 DIAGNOSIS — E78.00 PURE HYPERCHOLESTEROLEMIA: ICD-10-CM

## 2022-10-21 PROCEDURE — 1123F ACP DISCUSS/DSCN MKR DOCD: CPT | Performed by: INTERNAL MEDICINE

## 2022-10-21 PROCEDURE — 3288F FALL RISK ASSESSMENT DOCD: CPT | Performed by: INTERNAL MEDICINE

## 2022-10-21 PROCEDURE — 99214 OFFICE O/P EST MOD 30 MIN: CPT | Performed by: INTERNAL MEDICINE

## 2022-10-21 RX ORDER — MULTIVIT-MIN/IRON/FOLIC ACID/K 18-600-40
1 CAPSULE ORAL DAILY
Qty: 30 CAPSULE | Refills: 3 | Status: SHIPPED | OUTPATIENT
Start: 2022-10-21

## 2022-10-21 RX ORDER — METFORMIN HYDROCHLORIDE 500 MG/1
TABLET, EXTENDED RELEASE ORAL
Qty: 60 TABLET | Refills: 5 | Status: SHIPPED | OUTPATIENT
Start: 2022-10-21

## 2022-10-21 RX ORDER — FLUTICASONE PROPIONATE 50 MCG
2 SPRAY, SUSPENSION (ML) NASAL DAILY
Qty: 1 EACH | Refills: 2 | Status: SHIPPED | OUTPATIENT
Start: 2022-10-21

## 2022-10-21 RX ORDER — VALSARTAN AND HYDROCHLOROTHIAZIDE 320; 12.5 MG/1; MG/1
TABLET, FILM COATED ORAL
Qty: 90 TABLET | Refills: 1 | Status: SHIPPED | OUTPATIENT
Start: 2022-10-21

## 2022-10-21 RX ORDER — ATORVASTATIN CALCIUM 20 MG/1
20 TABLET, FILM COATED ORAL DAILY
Qty: 30 TABLET | Refills: 3 | Status: SHIPPED | OUTPATIENT
Start: 2022-10-21

## 2022-10-21 RX ORDER — AMLODIPINE BESYLATE 5 MG/1
TABLET ORAL
Qty: 90 TABLET | Refills: 1 | Status: SHIPPED | OUTPATIENT
Start: 2022-10-21

## 2022-10-21 RX ORDER — FAMOTIDINE 20 MG/1
TABLET, FILM COATED ORAL
Qty: 180 TABLET | Refills: 0 | Status: SHIPPED | OUTPATIENT
Start: 2022-10-21

## 2022-10-21 ASSESSMENT — PATIENT HEALTH QUESTIONNAIRE - PHQ9
SUM OF ALL RESPONSES TO PHQ QUESTIONS 1-9: 0
2. FEELING DOWN, DEPRESSED OR HOPELESS: 0
SUM OF ALL RESPONSES TO PHQ QUESTIONS 1-9: 0
1. LITTLE INTEREST OR PLEASURE IN DOING THINGS: 0
SUM OF ALL RESPONSES TO PHQ9 QUESTIONS 1 & 2: 0

## 2022-10-21 NOTE — PROGRESS NOTES
Follow Up Visit  Established Patient Visit    Patient:  Brandon Bhardwaj                                               : 1940  Age: 79 y.o. MRN: [id number]  Date : 10/21/2022      CHIEF COMPLAINT: Brandon Bhardwaj is a 79 y.o. female who presents for follow-up of bronchitis. 1. Bronchitis  Patient reports that she has been symptom-free after taking course of Z-Vahe. She does not have any chest pain, shortness of breath or cough. She does not report any fevers or chills. 2. Type 2 diabetes mellitus without complication, without long-term current use of insulin ([facility]  Patient reports that she has not been taking metformin because extended release tablet comes out as it is in her stool. Her blood glucose checks have ranged in 150s range. Last hemoglobin A1c checked was in  that was 7.8. She does not report any numbness or tingling over her feet. Sensation is intact. 3. Essential hypertension  This problem is somewhat uncontrolled. Patient reports that her blood pressure is in 867D systolic at home and she checks her blood pressure about 3 times a week. She reports being compliant to her medications. Blood pressure check today in the clinic initially was 158/90. On recheck it was 145/90. Patient reports that she has been watching what she eats.     Patient Active Problem List    Diagnosis Date Noted    Bronchitis 10/04/2022     Priority: Medium    Type 2 diabetes mellitus without complication ([facility]     Heart murmur 2016    Allergic rhinitis 2012    Arthropathy 2010    Leiomyoma of uterus 2010    Reflux esophagitis 2010    Pure hypercholesterolemia 2010    Essential hypertension 2010       Constitutional:  Denies fever or chills   Eyes:  Denies change in visual acuity   HENT: Endorses occasional nasal congestion denies sore throat   Respiratory:  Denies cough or shortness of breath   Cardiovascular:  Denies chest pain, endorses ankle edema after a long day on feet. GI:  Denies abdominal pain, nausea, vomiting, bloody stools or diarrhea   :  Denies dysuria   Musculoskeletal:  Denies back pain or joint pain   Integument:  Denies rash   Neurologic:  Denies headache, focal weakness or sensory changes   Endocrine:  Denies polyuria or polydipsia   Lymphatic:  Denies swollen glands   Psychiatric:  Denies depression or anxiety     Past Medical History:        Diagnosis Date    Arthropathy, unspecified, site unspecified 2010    Leiomyoma of uterus, unspecified 2010    Other and unspecified hyperlipidemia 2010    Reflux esophagitis 2010    [facility] 2 [facility] mellitus without complication ([facility]     Unspecified essential hypertension 2010       Past Surgical History:        Procedure Laterality Date     SECTION           Allergies:  Patient has no known allergies. Current Medications:    Prior to Admission medications    Medication Sig Start Date End Date Taking? Authorizing Provider   fluticasone (FLONASE) 50 MCG/ACT nasal spray 2 sprays by Nasal route daily 10/21/22  Yes Aidan Madrigal MD   famotidine (PEPCID) 20 MG tablet TAKE ONE TABLET BY MOUTH TWICE A DAY 10/21/22  Yes Aidan Madrigal MD   Cholecalciferol (VITAMIN D) 50 MCG ( UT) CAPS capsule Take 1 capsule by mouth daily 10/21/22  Yes Aidan Madrigal MD   Compression Stockings [address] [facility] by Does not apply route 10/21/22  Yes Aidan Madrigal MD   metFORMIN (GLUCOPHAGE XR) 500 MG extended release tablet one tablet by mouth twice daily.  10/21/22  Yes Aidan Madrigal MD   atorvastatin (LIPITOR) 20 MG tablet Take 1 tablet by mouth daily 10/21/22  Yes Aidan Madrigal MD   valsartan-hydroCHLOROthiazide (DIOVAN-HCT) 320-12.5 MG per tablet TAKE ONE TABLET BY MOUTH DAILY 10/21/22  Yes Aidan Madrigal MD   amLODIPine (NORVASC) 5 MG tablet TAKE ONE TABLET BY MOUTH DAILY 10/21/22  Yes Aidan Madrigal MD   blood glucose test strips (ONETOUCH ULTRA) strip USE ONE STRIP TO TEST TWICE A DAY AS DIRECTED 8/10/21  Yes Erma Davis MD   ibuprofen (ADVIL;MOTRIN) 800 MG tablet TAKE ONE TABLET BY MOUTH EVERY 6 HOURS AS NEEDED FOR PAIN 3/10/21  Yes Erma Davis MD   Flaxseed, Linseed, (FLAXSEED OIL PO) Take by mouth   Yes Historical Provider, MD   Omega-3 Fatty Acids (FISH OIL) 1000 MG CAPS Take 3,000 mg by mouth 3 times daily   Yes Historical Provider, MD           Physical Exam:      Constitutional:  Well developed, well nourished, no acute distress, non-toxic appearance   Eyes:  PERRL, conjunctiva normal   HENT:  Atraumatic, external ears normal, nose normal, oropharynx moist, no pharyngeal exudates. Neck- normal range of motion, no tenderness, supple   Respiratory:  No respiratory distress, normal breath sounds, no rales, no wheezing   Cardiovascular:  Normal rate, normal rhythm, no murmurs, no gallops, no rubs   GI:  Soft, nondistended, normal bowel sounds, nontender, no organomegaly, no mass, no rebound, no guarding   :  No costovertebral angle tenderness   Musculoskeletal:  No edema, no tenderness, no deformities. Back- no tenderness  Integument:  Well hydrated, no rash   Lymphatic:  No lymphadenopathy noted   Neurologic:  Alert & oriented x 3, CN 2-12 normal, normal motor function, normal sensory function, no focal deficits noted   Psychiatric:  Speech and behavior appropriate       Vitals: BP (!) 145/90 (Site: Left Upper Arm, Position: Sitting, Cuff Size: Medium Adult)   Pulse 96   Wt 182 lb (82.6 kg)   BMI 32.24 kg/m²     Body mass index is 32.24 kg/m².   Wt Readings from Last 3 Encounters:   10/21/22 182 lb (82.6 kg)   07/30/20 201 lb (91.2 kg)   01/13/20 199 lb (90.3 kg)         LABS:    CBC:   Lab Results   Component Value Date    WBC 6.2 07/30/2020    HGB 14.9 07/30/2020    HCT 45.1 07/30/2020    MCV 93.8 07/30/2020     07/30/2020         No results found for: IRON, TIBC, FERRITIN, FOLATE, [id number], PTH                                                          BMP:    Lab Results Component Value Date     07/30/2020    K 4.1 07/30/2020    CL 99 07/30/2020    CO2 27 07/30/2020       LFT's:   Lab Results   Component Value Date    ALT 11 01/13/2020    AST 13 (L) 01/13/2020    ALKPHOS 85 01/13/2020    BILITOT 0.5 01/13/2020       Lipids:   Lab Results   Component Value Date    CHOL 224 (H) 01/13/2020    HDL 74 (H) 01/13/2020    LDLCALC 133 (H) 01/13/2020    TRIG 84 01/13/2020       INR: No results found for: INR, PROTIME    U/A:  Lab Results   Component Value Date    LABMICR YES 02/05/2018          Lab Results   Component Value Date    LABA1C 7.8 07/30/2020        Lab Results   Component Value Date    CREATININE 0.8 07/30/2020       -----------------------------------------------------------------       Assessment/Plan:   1. Bronchitis  Has resolved after a course of Z-Vahe. Patient does not report any chest pain, shortness of breath or cough. 2. Type 2 diabetes mellitus without complication, without long-term current use of insulin ([address]  This problem is not controlled. Patient reports that her fasting blood glucose ranges and 150s. She stopped taking metformin extended release out of concern that she was noticing the Stool in her stool. Counseled the patient on taking the medication. Make any changes to her medication regimen. Awaiting future hemoglobin A1c for further management. Refilled medications today. - Hemoglobin A1C; Future  - LIPID PANEL; Future  - metFORMIN (GLUCOPHAGE XR) 500 MG extended release tablet; one tablet by mouth twice daily. Dispense: 60 tablet; Refill: 5    3. Essential hypertension  This problem is somewhat uncontrolled. Patient reports that she takes her blood pressure 3 times a week. Blood pressures have been ranging in early 003 systolic range at home. Today in the office her blood pressure on arrival was 158/90 that was rechecked and it turned out to be 145/90. Make no changes to the current medication regimen.   Instructed the medication to adhere to medications. We will monitor for now. - Comprehensive Metabolic Panel; Future  - CBC with Auto Differential; Future  - TSH with Reflex to FT4; Future    4. Pure hypercholesterolemia  Patient had hypercholesterolemia and labs done in 2020. Patient reports that she stopped taking atorvastatin intermittently. We will recheck labs. - atorvastatin (LIPITOR) 20 MG tablet; Take 1 tablet by mouth daily  Dispense: 30 tablet; Refill: 3    5. Vitamin D deficiency  Patient was vitamin D deficient. - Vitamin D 25 Hydroxy; Future     Patient was offered flu shot today, patient denied.      Nettie Juan MD

## 2023-01-09 RX ORDER — IBUPROFEN 800 MG/1
TABLET ORAL
Qty: 120 TABLET | Refills: 0 | Status: SHIPPED | OUTPATIENT
Start: 2023-01-09

## 2023-01-09 NOTE — TELEPHONE ENCOUNTER
Patient needs a refill on:    ibuprofen (ADVIL;MOTRIN) 800 MG tablet    [facility] no longer takes her insurance so please send everything to [facility].     [address], [place]

## 2023-01-23 ENCOUNTER — OFFICE VISIT (OUTPATIENT)
Dept: INTERNAL MEDICINE CLINIC | Age: 83
End: 2023-01-23
Payer: MEDICARE

## 2023-01-23 VITALS
HEIGHT: 63 IN | DIASTOLIC BLOOD PRESSURE: 90 MMHG | WEIGHT: 183 LBS | BODY MASS INDEX: 32.43 KG/M2 | SYSTOLIC BLOOD PRESSURE: 160 MMHG

## 2023-01-23 DIAGNOSIS — E11.9 TYPE 2 DIABETES MELLITUS WITHOUT COMPLICATION, WITHOUT LONG-TERM CURRENT USE OF INSULIN (HCC): ICD-10-CM

## 2023-01-23 DIAGNOSIS — I10 PRIMARY HYPERTENSION: Primary | ICD-10-CM

## 2023-01-23 PROCEDURE — 99213 OFFICE O/P EST LOW 20 MIN: CPT | Performed by: INTERNAL MEDICINE

## 2023-01-23 PROCEDURE — 3080F DIAST BP >= 90 MM HG: CPT | Performed by: INTERNAL MEDICINE

## 2023-01-23 PROCEDURE — 1123F ACP DISCUSS/DSCN MKR DOCD: CPT | Performed by: INTERNAL MEDICINE

## 2023-01-23 PROCEDURE — 3077F SYST BP >= 140 MM HG: CPT | Performed by: INTERNAL MEDICINE

## 2023-01-23 RX ORDER — VALSARTAN AND HYDROCHLOROTHIAZIDE 320; 25 MG/1; MG/1
1 TABLET, FILM COATED ORAL DAILY
Qty: 90 TABLET | Refills: 3 | Status: SHIPPED | OUTPATIENT
Start: 2023-01-23

## 2023-01-23 ASSESSMENT — PATIENT HEALTH QUESTIONNAIRE - PHQ9
SUM OF ALL RESPONSES TO PHQ QUESTIONS 1-9: 0
SUM OF ALL RESPONSES TO PHQ9 QUESTIONS 1 & 2: 0
2. FEELING DOWN, DEPRESSED OR HOPELESS: 0
SUM OF ALL RESPONSES TO PHQ QUESTIONS 1-9: 0
1. LITTLE INTEREST OR PLEASURE IN DOING THINGS: 0

## 2023-01-23 NOTE — PROGRESS NOTES
CHIEF COMPLAINT: Josef Lin is a 79 y.o. female who presents for : Follow-up hypertension follow-up of her hypertension she has been feeling fine her blood sugars been well controlled she has no other complaints    HPI: Patient presented with as above    Review of Systems:   Constitutional:  Denies fever or chills   Eyes:  Denies change in visual acuity   HENT:  Denies nasal congestion or sore throat   Respiratory:  Denies cough or shortness of breath   Cardiovascular:  Denies chest pain or edema   GI:  Denies abdominal pain, nausea, vomiting, bloody stools or diarrhea   :  Denies dysuria   Musculoskeletal:  Denies back pain or joint pain   Integument:  Denies rash   Neurologic:  Denies headache, focal weakness or sensory changes   Endocrine:  Denies polyuria or polydipsia   Lymphatic:  Denies swollen glands   Psychiatric:  Denies depression or anxiety     Past Medical History:        Diagnosis Date    Arthropathy, unspecified, site unspecified 2010    Leiomyoma of uterus, unspecified 2010    Other and unspecified hyperlipidemia 2010    Reflux esophagitis 2010    Type 2 diabetes mellitus without complication [facility] 386    Unspecified essential hypertension 2010       Past Surgical History:        Procedure Laterality Date     SECTION         Family History:  Family History   Problem Relation Age of Onset    Diabetes Mother     Heart Attack Mother     Hypertension Father        Social History:  Social History     Socioeconomic History    Marital status:       Spouse name: None    Number of children: None    Years of education: None    Highest education level: None   Tobacco Use    Smoking status: Never    Smokeless tobacco: Never   Substance and Sexual Activity    Alcohol use: Yes    Drug use: No     Social Determinants of Health     Financial Resource Strain: Low Risk     Difficulty of Paying Living Expenses: Not hard at all   Food Insecurity: No Food Insecurity    Worried About Running Out of Food in the Last Year: Never true    Ran Out of Food in the Last Year: Never true         Allergies:  Patient has no known allergies.    Current Medications:    Prior to Admission medications    Medication Sig Start Date End Date Taking? Authorizing Provider   valsartan-hydroCHLOROthiazide (DIOVAN-HCT) 320-25 MG per tablet Take 1 tablet by mouth daily 1/23/23  Yes Juventino Pop MD   ibuprofen (ADVIL;MOTRIN) 800 MG tablet TAKE ONE TABLET BY MOUTH EVERY 6 HOURS AS NEEDED FOR PAIN 1/9/23   Juventino Pop MD   fluticasone (FLONASE) 50 MCG/ACT nasal spray 2 sprays by Nasal route daily 10/21/22   Juventino Pop MD   famotidine (PEPCID) 20 MG tablet TAKE ONE TABLET BY MOUTH TWICE A DAY 10/21/22   Juventino Pop MD   Cholecalciferol (VITAMIN D) 50 MCG (2000 UT) CAPS capsule Take 1 capsule by mouth daily 10/21/22   Juventino Pop MD   Compression Stockings MISC by Does not apply route 10/21/22   Juventino Pop MD   metFORMIN (GLUCOPHAGE XR) 500 MG extended release tablet one tablet by mouth twice daily. 10/21/22   Juventino Pop MD   atorvastatin (LIPITOR) 20 MG tablet Take 1 tablet by mouth daily 10/21/22   Juventino Pop MD   amLODIPine (NORVASC) 5 MG tablet TAKE ONE TABLET BY MOUTH DAILY 10/21/22   Juventino Pop MD   Flaxseed, Linseed, (FLAXSEED OIL PO) Take by mouth    Historical Provider, MD   Omega-3 Fatty Acids (FISH OIL) 1000 MG CAPS Take 3,000 mg by mouth 3 times daily    Historical Provider, MD       Physical Exam:  Vital Signs: BP (!) 160/90   Ht 5' 3\" (1.6 m)   Wt 183 lb (83 kg)   BMI 32.42 kg/m²   General: Patient appears  non-toxic  HENT: Atraumatic, normocephalic, oral mucosa moist  Lungs:  Clear bilaterally  Heart: Regular rate and rhythm  Abdomen: Non-distended, soft, non-tender  Extremities: No edema  Neuro: Nonfocal    Medical Decision Making and Plan:  Pertinent Labs & Imaging studies reviewed. (See chart for details)  Hypertension still with elevated blood pressure will increase Diovan  HCT to 3/20/2025 and follow-up in 3 months at that time we will get blood studies    There are no diagnoses linked to this encounter.

## 2023-04-23 SDOH — ECONOMIC STABILITY: INCOME INSECURITY: HOW HARD IS IT FOR YOU TO PAY FOR THE VERY BASICS LIKE FOOD, HOUSING, MEDICAL CARE, AND HEATING?: NOT HARD AT ALL

## 2023-04-23 SDOH — ECONOMIC STABILITY: FOOD INSECURITY: WITHIN THE PAST 12 MONTHS, YOU WORRIED THAT YOUR FOOD WOULD RUN OUT BEFORE YOU GOT MONEY TO BUY MORE.: NEVER TRUE

## 2023-04-23 SDOH — ECONOMIC STABILITY: FOOD INSECURITY: WITHIN THE PAST 12 MONTHS, THE FOOD YOU BOUGHT JUST DIDN'T LAST AND YOU DIDN'T HAVE MONEY TO GET MORE.: NEVER TRUE

## 2023-04-23 SDOH — ECONOMIC STABILITY: TRANSPORTATION INSECURITY
IN THE PAST 12 MONTHS, HAS LACK OF TRANSPORTATION KEPT YOU FROM MEETINGS, WORK, OR FROM GETTING THINGS NEEDED FOR DAILY LIVING?: NO

## 2023-04-23 SDOH — ECONOMIC STABILITY: HOUSING INSECURITY
IN THE LAST 12 MONTHS, WAS THERE A TIME WHEN YOU DID NOT HAVE A STEADY PLACE TO SLEEP OR SLEPT IN A SHELTER (INCLUDING NOW)?: NO

## 2023-04-26 ENCOUNTER — OFFICE VISIT (OUTPATIENT)
Dept: INTERNAL MEDICINE CLINIC | Age: 83
End: 2023-04-26

## 2023-04-26 VITALS
TEMPERATURE: 98.2 F | BODY MASS INDEX: 31.35 KG/M2 | OXYGEN SATURATION: 97 % | SYSTOLIC BLOOD PRESSURE: 130 MMHG | HEART RATE: 100 BPM | DIASTOLIC BLOOD PRESSURE: 85 MMHG | WEIGHT: 177 LBS

## 2023-04-26 DIAGNOSIS — I10 PRIMARY HYPERTENSION: Primary | ICD-10-CM

## 2023-04-26 NOTE — PROGRESS NOTES
office 130/85 improved from her last office visit back in 1/2023   - She is complaint with her medications and tolerating it well   - For now will continue DIOVAN--25 mg   - Encouraged her to exercise, Consume low salt diet and stay away from fast food. She has an understanding. Follow up in 3 months.

## 2023-07-26 ENCOUNTER — OFFICE VISIT (OUTPATIENT)
Dept: INTERNAL MEDICINE CLINIC | Age: 83
End: 2023-07-26

## 2023-07-26 VITALS
WEIGHT: 172 LBS | HEIGHT: 63 IN | DIASTOLIC BLOOD PRESSURE: 80 MMHG | SYSTOLIC BLOOD PRESSURE: 130 MMHG | OXYGEN SATURATION: 98 % | HEART RATE: 96 BPM | TEMPERATURE: 98.1 F | BODY MASS INDEX: 30.48 KG/M2

## 2023-07-26 DIAGNOSIS — Z23 NEED FOR PNEUMOCOCCAL 20-VALENT CONJUGATE VACCINATION: ICD-10-CM

## 2023-07-26 DIAGNOSIS — D25.9 UTERINE LEIOMYOMA, UNSPECIFIED LOCATION: ICD-10-CM

## 2023-07-26 DIAGNOSIS — K21.00 GASTROESOPHAGEAL REFLUX DISEASE WITH ESOPHAGITIS, UNSPECIFIED WHETHER HEMORRHAGE: ICD-10-CM

## 2023-07-26 DIAGNOSIS — I10 ESSENTIAL HYPERTENSION: ICD-10-CM

## 2023-07-26 DIAGNOSIS — E78.00 PURE HYPERCHOLESTEROLEMIA: ICD-10-CM

## 2023-07-26 DIAGNOSIS — E11.9 TYPE 2 DIABETES MELLITUS WITHOUT COMPLICATION, WITHOUT LONG-TERM CURRENT USE OF INSULIN (HCC): ICD-10-CM

## 2023-07-26 DIAGNOSIS — Z00.00 PE (PHYSICAL EXAM), ANNUAL: Primary | ICD-10-CM

## 2023-07-26 DIAGNOSIS — E55.9 VITAMIN D DEFICIENCY: ICD-10-CM

## 2023-07-26 PROBLEM — J40 BRONCHITIS: Status: RESOLVED | Noted: 2022-10-04 | Resolved: 2023-07-26

## 2023-07-26 ASSESSMENT — PATIENT HEALTH QUESTIONNAIRE - PHQ9
SUM OF ALL RESPONSES TO PHQ QUESTIONS 1-9: 0
1. LITTLE INTEREST OR PLEASURE IN DOING THINGS: 0
SUM OF ALL RESPONSES TO PHQ9 QUESTIONS 1 & 2: 0
SUM OF ALL RESPONSES TO PHQ QUESTIONS 1-9: 0
2. FEELING DOWN, DEPRESSED OR HOPELESS: 0

## 2023-07-26 ASSESSMENT — LIFESTYLE VARIABLES
HOW MANY STANDARD DRINKS CONTAINING ALCOHOL DO YOU HAVE ON A TYPICAL DAY: 1 OR 2
HOW OFTEN DO YOU HAVE A DRINK CONTAINING ALCOHOL: MONTHLY OR LESS

## 2023-07-26 NOTE — PROGRESS NOTES
Annual Wellness Visit     Patient:  Pattie Shah                                               : 1940  Age: 79 y.o. MRN: [id number]  Date : 2023      CHIEF COMPLAINT: Pattie Shah is a 79 y.o. female who presents for : Physical exam    1. Type 2 diabetes mellitus without complication, without long-term current use of insulin ([address])  Does not watch her blood sugar no polyuria polydipsia  - CBC with Auto Differential; Future  - Comprehensive Metabolic Panel; Future  - Lipid Panel; Future  - TSH; Future  - Urinalysis; Future  - Vitamin D 25 Hydroxy; Future  - Hemoglobin A1C; Future  - AFL - Dl Keating, , Gynecologic Oncology, [facility]    2. Pure hypercholesterolemia  No complaints of myalgias  - CBC with Auto Differential; Future  - Comprehensive Metabolic Panel; Future  - Lipid Panel; Future  - TSH; Future  - Urinalysis; Future  - Vitamin D 25 Hydroxy; Future  - Hemoglobin A1C; Future  - AFL - Dl Ke[place], , Gynecologic Oncology, [facility] [place]    3. Essential hypertension  Blood pressure controlled at home    4. Gastroesophageal reflux disease with esophagitis, unspecified whether hemorrhage  This problem is stable    5. Uterine leiomyoma, unspecified location  Known history of fibroids still has not had them removed  - AFL - Zuri, Dl, , [facility]ologic [facility], [facility]    6. PE (physical exam), annual  Generally feels good denies any chest pain shortness of breath or any other  - CBC with Auto Differential; Future  - Comprehensive Metabolic Panel; Future  - Lipid Panel; Future  - TSH; Future  - Urinalysis; Future  - Vitamin D 25 Hydroxy; Future  - Hemoglobin A1C; Future  - AFL - Dl [place], , [facility]    7. Vitamin D deficiency    - Vitamin D 25 Hydroxy;  Future        Patient Active Problem List    Diagnosis Date Noted    Type 2 diabetes mellitus without complication ([address])     Heart murmur 2016    Allergic rhinitis 2012

## 2023-07-27 DIAGNOSIS — E78.00 PURE HYPERCHOLESTEROLEMIA: ICD-10-CM

## 2023-07-27 DIAGNOSIS — E55.9 VITAMIN D DEFICIENCY: ICD-10-CM

## 2023-07-27 DIAGNOSIS — E11.9 TYPE 2 DIABETES MELLITUS WITHOUT COMPLICATION, WITHOUT LONG-TERM CURRENT USE OF INSULIN (HCC): ICD-10-CM

## 2023-07-27 DIAGNOSIS — Z00.00 PE (PHYSICAL EXAM), ANNUAL: ICD-10-CM

## 2023-07-27 LAB
BACTERIA URNS QL MICRO: ABNORMAL /HPF
BILIRUB UR QL STRIP.AUTO: NEGATIVE
CLARITY UR: ABNORMAL
COLOR UR: YELLOW
EPI CELLS #/AREA URNS AUTO: 19 /HPF (ref 0–5)
GLUCOSE UR STRIP.AUTO-MCNC: NEGATIVE MG/DL
HGB UR QL STRIP.AUTO: NEGATIVE
HYALINE CASTS #/AREA URNS AUTO: 2 /LPF (ref 0–8)
KETONES UR STRIP.AUTO-MCNC: ABNORMAL MG/DL
LEUKOCYTE ESTERASE UR QL STRIP.AUTO: ABNORMAL
NITRITE UR QL STRIP.AUTO: NEGATIVE
PH UR STRIP.AUTO: 5.5 [PH] (ref 5–8)
PROT UR STRIP.AUTO-MCNC: 30 MG/DL
RBC CLUMPS #/AREA URNS AUTO: 3 /HPF (ref 0–4)
SP GR UR STRIP.AUTO: 1.03 (ref 1–1.03)
UA DIPSTICK W REFLEX MICRO PNL UR: YES
URN SPEC COLLECT METH UR: ABNORMAL
UROBILINOGEN UR STRIP-ACNC: 1 E.U./DL
WBC #/AREA URNS AUTO: 3 /HPF (ref 0–5)

## 2023-07-28 ENCOUNTER — TELEPHONE (OUTPATIENT)
Dept: INTERNAL MEDICINE CLINIC | Age: 83
End: 2023-07-28

## 2023-07-28 LAB
25(OH)D3 SERPL-MCNC: 68.8 NG/ML
ALBUMIN SERPL-MCNC: 4.1 G/DL (ref 3.4–5)
ALBUMIN/GLOB SERPL: 1.6 {RATIO} (ref 1.1–2.2)
ALP SERPL-CCNC: 109 U/L (ref 40–129)
ALT SERPL-CCNC: 11 U/L (ref 10–40)
ANION GAP SERPL CALCULATED.3IONS-SCNC: 13 MMOL/L (ref 3–16)
AST SERPL-CCNC: 14 U/L (ref 15–37)
BASOPHILS # BLD: 0.1 K/UL (ref 0–0.2)
BASOPHILS NFR BLD: 0.9 %
BILIRUB SERPL-MCNC: 0.3 MG/DL (ref 0–1)
BUN SERPL-MCNC: 21 MG/DL (ref 7–20)
CALCIUM SERPL-MCNC: 10 MG/DL (ref 8.3–10.6)
CHLORIDE SERPL-SCNC: 101 MMOL/L (ref 99–110)
CHOLEST SERPL-MCNC: 203 MG/DL (ref 0–199)
CO2 SERPL-SCNC: 26 MMOL/L (ref 21–32)
CREAT SERPL-MCNC: 0.8 MG/DL (ref 0.6–1.2)
DEPRECATED RDW RBC AUTO: 14.8 % (ref 12.4–15.4)
EOSINOPHIL # BLD: 0.1 K/UL (ref 0–0.6)
EOSINOPHIL NFR BLD: 1.2 %
EST. AVERAGE GLUCOSE BLD GHB EST-MCNC: 157.1 MG/DL
GFR SERPLBLD CREATININE-BSD FMLA CKD-EPI: >60 ML/MIN/{1.73_M2}
GLUCOSE SERPL-MCNC: 135 MG/DL (ref 70–99)
HBA1C MFR BLD: 7.1 %
HCT VFR BLD AUTO: 40.4 % (ref 36–48)
HDLC SERPL-MCNC: 70 MG/DL (ref 40–60)
HGB BLD-MCNC: 13.6 G/DL (ref 12–16)
LDLC SERPL CALC-MCNC: 118 MG/DL
LYMPHOCYTES # BLD: 2.1 K/UL (ref 1–5.1)
LYMPHOCYTES NFR BLD: 35.2 %
MCH RBC QN AUTO: 30.7 PG (ref 26–34)
MCHC RBC AUTO-ENTMCNC: 33.7 G/DL (ref 31–36)
MCV RBC AUTO: 90.9 FL (ref 80–100)
MONOCYTES # BLD: 0.5 K/UL (ref 0–1.3)
MONOCYTES NFR BLD: 8.1 %
NEUTROPHILS # BLD: 3.3 K/UL (ref 1.7–7.7)
NEUTROPHILS NFR BLD: 54.6 %
PLATELET # BLD AUTO: 260 K/UL (ref 135–450)
PMV BLD AUTO: 8.6 FL (ref 5–10.5)
POTASSIUM SERPL-SCNC: 3.8 MMOL/L (ref 3.5–5.1)
PROT SERPL-MCNC: 6.7 G/DL (ref 6.4–8.2)
RBC # BLD AUTO: 4.45 M/UL (ref 4–5.2)
SODIUM SERPL-SCNC: 140 MMOL/L (ref 136–145)
TRIGL SERPL-MCNC: 73 MG/DL (ref 0–150)
TSH SERPL DL<=0.005 MIU/L-ACNC: 2.91 UIU/ML (ref 0.27–4.2)
VLDLC SERPL CALC-MCNC: 15 MG/DL
WBC # BLD AUTO: 6 K/UL (ref 4–11)

## 2023-07-31 ENCOUNTER — TELEPHONE (OUTPATIENT)
Dept: INTERNAL MEDICINE CLINIC | Age: 83
End: 2023-07-31

## 2023-07-31 NOTE — TELEPHONE ENCOUNTER
Oncology calling stating that they have tried to reach the pt X 3 with n/a. If we reach patient please have her call [phone].

## 2023-07-31 NOTE — TELEPHONE ENCOUNTER
I spoke with Vasu and she states that she will call them once she gets back from vacation. In about 3 weeks.

## 2023-08-01 NOTE — TELEPHONE ENCOUNTER
Call returned to the patient to discuss results. Left message for patient to call back to discuss results.

## 2023-09-26 ENCOUNTER — TELEPHONE (OUTPATIENT)
Dept: INTERNAL MEDICINE CLINIC | Age: 83
End: 2023-09-26

## 2023-09-26 NOTE — TELEPHONE ENCOUNTER
prescription  (Newest Message First)  View All Conversations on this Encounter  Vasu Lange [facility] [address] (supporting Roz Lofton MD) 46 minutes ago (10:00 AM)     MB  Have a cough ,no temperature,muscous. Grand children and Eunice Ambrose had same symptoms ,seen by [facility] and negative results for covid and flu thankfully. Wondering if you could Phone in something for me at [address]    Thanks   [employer]

## 2024-02-15 RX ORDER — AMLODIPINE BESYLATE 5 MG/1
TABLET ORAL
Qty: 90 TABLET | Refills: 0 | Status: SHIPPED | OUTPATIENT
Start: 2024-02-15

## 2024-02-15 RX ORDER — VALSARTAN AND HYDROCHLOROTHIAZIDE 320; 25 MG/1; MG/1
1 TABLET, FILM COATED ORAL DAILY
Qty: 90 TABLET | Refills: 0 | Status: SHIPPED | OUTPATIENT
Start: 2024-02-15

## 2024-02-19 RX ORDER — IBUPROFEN 800 MG/1
TABLET ORAL
Qty: 120 TABLET | Refills: 0 | Status: SHIPPED | OUTPATIENT
Start: 2024-02-19

## 2024-07-25 RX ORDER — IBUPROFEN 800 MG/1
TABLET ORAL
Qty: 120 TABLET | Refills: 1 | Status: SHIPPED | OUTPATIENT
Start: 2024-07-25

## 2024-08-07 DIAGNOSIS — E78.00 PURE HYPERCHOLESTEROLEMIA: ICD-10-CM

## 2024-08-07 RX ORDER — ATORVASTATIN CALCIUM 20 MG/1
20 TABLET, FILM COATED ORAL DAILY
Qty: 90 TABLET | Refills: 0 | OUTPATIENT
Start: 2024-08-07

## 2024-08-09 DIAGNOSIS — E78.00 PURE HYPERCHOLESTEROLEMIA: ICD-10-CM

## 2024-08-09 RX ORDER — ATORVASTATIN CALCIUM 20 MG/1
20 TABLET, FILM COATED ORAL DAILY
Qty: 30 TABLET | Refills: 3 | Status: SHIPPED | OUTPATIENT
Start: 2024-08-09

## 2024-08-12 ENCOUNTER — PATIENT MESSAGE (OUTPATIENT)
Dept: INTERNAL MEDICINE CLINIC | Age: 84
End: 2024-08-12

## 2024-08-12 DIAGNOSIS — E78.00 PURE HYPERCHOLESTEROLEMIA: ICD-10-CM

## 2024-08-12 RX ORDER — ATORVASTATIN CALCIUM 20 MG/1
20 TABLET, FILM COATED ORAL DAILY
Qty: 90 TABLET | Refills: 1 | Status: CANCELLED | OUTPATIENT
Start: 2024-08-12

## 2024-08-12 RX ORDER — ATORVASTATIN CALCIUM 20 MG/1
20 TABLET, FILM COATED ORAL DAILY
Qty: 30 TABLET | Refills: 3 | Status: SHIPPED | OUTPATIENT
Start: 2024-08-12

## 2024-08-12 NOTE — TELEPHONE ENCOUNTER
Pt needs this script resent to a different pharm    atorvastatin (LIPITOR) 20 MG tablet [[id number]]    Pharm[facility] [address] [place] [address] -  [phone] - F [phone]

## 2024-08-21 ENCOUNTER — OFFICE VISIT (OUTPATIENT)
Dept: INTERNAL MEDICINE CLINIC | Age: 84
End: 2024-08-21

## 2024-08-21 VITALS
BODY MASS INDEX: 27.63 KG/M2 | TEMPERATURE: 97.5 F | DIASTOLIC BLOOD PRESSURE: 82 MMHG | HEART RATE: 107 BPM | SYSTOLIC BLOOD PRESSURE: 140 MMHG | OXYGEN SATURATION: 99 % | WEIGHT: 156 LBS

## 2024-08-21 DIAGNOSIS — E78.00 PURE HYPERCHOLESTEROLEMIA: ICD-10-CM

## 2024-08-21 DIAGNOSIS — I10 PRIMARY HYPERTENSION: ICD-10-CM

## 2024-08-21 DIAGNOSIS — E11.9 TYPE 2 DIABETES MELLITUS WITHOUT COMPLICATION, WITHOUT LONG-TERM CURRENT USE OF INSULIN (HCC): ICD-10-CM

## 2024-08-21 DIAGNOSIS — E11.9 TYPE 2 DIABETES MELLITUS WITHOUT COMPLICATION, WITHOUT LONG-TERM CURRENT USE OF INSULIN (HCC): Primary | ICD-10-CM

## 2024-08-21 LAB
ALBUMIN SERPL-MCNC: 4.4 G/DL (ref 3.4–5)
ALBUMIN/GLOB SERPL: 1.7 {RATIO} (ref 1.1–2.2)
ALP SERPL-CCNC: 85 U/L (ref 40–129)
ALT SERPL-CCNC: 14 U/L (ref 10–40)
ANION GAP SERPL CALCULATED.3IONS-SCNC: 12 MMOL/L (ref 3–16)
AST SERPL-CCNC: 17 U/L (ref 15–37)
BACTERIA URNS QL MICRO: NORMAL /HPF
BASOPHILS # BLD: 0.1 K/UL (ref 0–0.2)
BASOPHILS NFR BLD: 0.8 %
BILIRUB SERPL-MCNC: 0.3 MG/DL (ref 0–1)
BILIRUB UR QL STRIP.AUTO: NEGATIVE
BUN SERPL-MCNC: 19 MG/DL (ref 7–20)
CALCIUM SERPL-MCNC: 10.2 MG/DL (ref 8.3–10.6)
CHLORIDE SERPL-SCNC: 99 MMOL/L (ref 99–110)
CHOLEST SERPL-MCNC: 237 MG/DL (ref 0–199)
CLARITY UR: CLEAR
CO2 SERPL-SCNC: 27 MMOL/L (ref 21–32)
COLOR UR: YELLOW
CREAT SERPL-MCNC: 0.8 MG/DL (ref 0.6–1.2)
DEPRECATED RDW RBC AUTO: 14.3 % (ref 12.4–15.4)
EOSINOPHIL # BLD: 0.1 K/UL (ref 0–0.6)
EOSINOPHIL NFR BLD: 0.8 %
EPI CELLS #/AREA URNS AUTO: 3 /HPF (ref 0–5)
GFR SERPLBLD CREATININE-BSD FMLA CKD-EPI: 72 ML/MIN/{1.73_M2}
GLUCOSE SERPL-MCNC: 167 MG/DL (ref 70–99)
GLUCOSE UR STRIP.AUTO-MCNC: NEGATIVE MG/DL
HCT VFR BLD AUTO: 42.9 % (ref 36–48)
HDLC SERPL-MCNC: 91 MG/DL (ref 40–60)
HGB BLD-MCNC: 14.4 G/DL (ref 12–16)
HGB UR QL STRIP.AUTO: NEGATIVE
HYALINE CASTS #/AREA URNS AUTO: 0 /LPF (ref 0–8)
KETONES UR STRIP.AUTO-MCNC: NEGATIVE MG/DL
LDLC SERPL CALC-MCNC: 133 MG/DL
LEUKOCYTE ESTERASE UR QL STRIP.AUTO: ABNORMAL
LYMPHOCYTES # BLD: 2.4 K/UL (ref 1–5.1)
LYMPHOCYTES NFR BLD: 30.2 %
MCH RBC QN AUTO: 31.2 PG (ref 26–34)
MCHC RBC AUTO-ENTMCNC: 33.4 G/DL (ref 31–36)
MCV RBC AUTO: 93.4 FL (ref 80–100)
MONOCYTES # BLD: 0.4 K/UL (ref 0–1.3)
MONOCYTES NFR BLD: 5.7 %
NEUTROPHILS # BLD: 4.9 K/UL (ref 1.7–7.7)
NEUTROPHILS NFR BLD: 62.5 %
NITRITE UR QL STRIP.AUTO: NEGATIVE
PH UR STRIP.AUTO: 6 [PH] (ref 5–8)
PLATELET # BLD AUTO: 245 K/UL (ref 135–450)
PMV BLD AUTO: 8.7 FL (ref 5–10.5)
POTASSIUM SERPL-SCNC: 3.8 MMOL/L (ref 3.5–5.1)
PROT SERPL-MCNC: 7 G/DL (ref 6.4–8.2)
PROT UR STRIP.AUTO-MCNC: NEGATIVE MG/DL
RBC # BLD AUTO: 4.59 M/UL (ref 4–5.2)
RBC CLUMPS #/AREA URNS AUTO: 1 /HPF (ref 0–4)
SODIUM SERPL-SCNC: 138 MMOL/L (ref 136–145)
SP GR UR STRIP.AUTO: 1.01 (ref 1–1.03)
TRIGL SERPL-MCNC: 67 MG/DL (ref 0–150)
TSH SERPL DL<=0.005 MIU/L-ACNC: 1.61 UIU/ML (ref 0.27–4.2)
UA COMPLETE W REFLEX CULTURE PNL UR: ABNORMAL
UA DIPSTICK W REFLEX MICRO PNL UR: YES
URN SPEC COLLECT METH UR: ABNORMAL
UROBILINOGEN UR STRIP-ACNC: 0.2 E.U./DL
VLDLC SERPL CALC-MCNC: 13 MG/DL
WBC # BLD AUTO: 7.8 K/UL (ref 4–11)
WBC #/AREA URNS AUTO: 2 /HPF (ref 0–5)

## 2024-08-21 SDOH — ECONOMIC STABILITY: FOOD INSECURITY: WITHIN THE PAST 12 MONTHS, THE FOOD YOU BOUGHT JUST DIDN'T LAST AND YOU DIDN'T HAVE MONEY TO GET MORE.: NEVER TRUE

## 2024-08-21 SDOH — ECONOMIC STABILITY: FOOD INSECURITY: WITHIN THE PAST 12 MONTHS, YOU WORRIED THAT YOUR FOOD WOULD RUN OUT BEFORE YOU GOT MONEY TO BUY MORE.: NEVER TRUE

## 2024-08-21 SDOH — ECONOMIC STABILITY: INCOME INSECURITY: HOW HARD IS IT FOR YOU TO PAY FOR THE VERY BASICS LIKE FOOD, HOUSING, MEDICAL CARE, AND HEATING?: NOT HARD AT ALL

## 2024-08-21 ASSESSMENT — PATIENT HEALTH QUESTIONNAIRE - PHQ9
SUM OF ALL RESPONSES TO PHQ QUESTIONS 1-9: 0
SUM OF ALL RESPONSES TO PHQ QUESTIONS 1-9: 0
2. FEELING DOWN, DEPRESSED OR HOPELESS: NOT AT ALL
SUM OF ALL RESPONSES TO PHQ QUESTIONS 1-9: 0
SUM OF ALL RESPONSES TO PHQ9 QUESTIONS 1 & 2: 0
1. LITTLE INTEREST OR PLEASURE IN DOING THINGS: NOT AT ALL
SUM OF ALL RESPONSES TO PHQ QUESTIONS 1-9: 0

## 2024-08-21 NOTE — PROGRESS NOTES
not taking: Reported on 8/21/2024 10/21/22   Juventino Pop MD       Physical Exam:  Vital Signs: BP (!) 140/82   Pulse (!) 107   Temp 97.5 °F (36.4 °C)   Wt 70.8 kg (156 lb)   SpO2 99%   BMI 27.63 kg/m²   General: Patient appears  non-toxic  HENT: Atraumatic, normocephalic, oral mucosa moist  Lungs:  Clear bilaterally  Heart: Regular rate and rhythm  Abdomen: Non-distended, soft, non-tender  Extremities: No edema  Neuro: Nonfocal    Medical Decision Making and Plan:  Pertinent Labs & Imaging studies reviewed. (See chart for details)  Blood studies are pending    1. Type 2 diabetes mellitus without complication, without long-term current use of insulin (HCC)  Above labs if her glycohemoglobin is over 7% will restart Glucophage  - CBC with Auto Differential; Future  - Comprehensive Metabolic Panel; Future  - Lipid Panel; Future  - TSH; Future  - Hemoglobin A1C; Future  - Urinalysis with Reflex to Culture; Future    2. Pure hypercholesterolemia  This problem is stable check above labs  - CBC with Auto Differential; Future  - Comprehensive Metabolic Panel; Future  - Lipid Panel; Future  - TSH; Future  - Hemoglobin A1C; Future  - Urinalysis with Reflex to Culture; Future    3. Primary hypertension  Pressure borderline for follow-up in 3 months for physical continue present meds for now  - CBC with Auto Differential; Future  - Comprehensive Metabolic Panel; Future  - Lipid Panel; Future  - TSH; Future  - Hemoglobin A1C; Future

## 2024-08-22 LAB
EST. AVERAGE GLUCOSE BLD GHB EST-MCNC: 145.6 MG/DL
HBA1C MFR BLD: 6.7 %

## 2024-08-22 RX ORDER — VALSARTAN AND HYDROCHLOROTHIAZIDE 320; 25 MG/1; MG/1
1 TABLET, FILM COATED ORAL DAILY
Qty: 90 TABLET | Refills: 1 | Status: SHIPPED | OUTPATIENT
Start: 2024-08-22

## 2024-08-22 RX ORDER — AMLODIPINE BESYLATE 5 MG/1
TABLET ORAL
Qty: 90 TABLET | Refills: 1 | Status: SHIPPED | OUTPATIENT
Start: 2024-08-22

## 2024-12-04 ENCOUNTER — OFFICE VISIT (OUTPATIENT)
Dept: INTERNAL MEDICINE CLINIC | Age: 84
End: 2024-12-04

## 2024-12-04 VITALS
DIASTOLIC BLOOD PRESSURE: 80 MMHG | WEIGHT: 176 LBS | SYSTOLIC BLOOD PRESSURE: 140 MMHG | BODY MASS INDEX: 31.18 KG/M2 | HEIGHT: 63 IN

## 2024-12-04 DIAGNOSIS — K21.00 GASTROESOPHAGEAL REFLUX DISEASE WITH ESOPHAGITIS, UNSPECIFIED WHETHER HEMORRHAGE: ICD-10-CM

## 2024-12-04 DIAGNOSIS — E55.9 VITAMIN D DEFICIENCY: ICD-10-CM

## 2024-12-04 DIAGNOSIS — E78.00 PURE HYPERCHOLESTEROLEMIA: ICD-10-CM

## 2024-12-04 DIAGNOSIS — I10 ESSENTIAL HYPERTENSION: ICD-10-CM

## 2024-12-04 DIAGNOSIS — E11.9 TYPE 2 DIABETES MELLITUS WITHOUT COMPLICATION, WITHOUT LONG-TERM CURRENT USE OF INSULIN (HCC): ICD-10-CM

## 2024-12-04 DIAGNOSIS — Z00.00 PE (PHYSICAL EXAM), ANNUAL: Primary | ICD-10-CM

## 2024-12-04 DIAGNOSIS — D25.9 UTERINE LEIOMYOMA, UNSPECIFIED LOCATION: ICD-10-CM

## 2024-12-04 DIAGNOSIS — Z00.00 PE (PHYSICAL EXAM), ANNUAL: ICD-10-CM

## 2024-12-04 LAB
25(OH)D3 SERPL-MCNC: 52.1 NG/ML
ALBUMIN SERPL-MCNC: 4.2 G/DL (ref 3.4–5)
ALBUMIN/GLOB SERPL: 1.8 {RATIO} (ref 1.1–2.2)
ALP SERPL-CCNC: 97 U/L (ref 40–129)
ALT SERPL-CCNC: 16 U/L (ref 10–40)
ANION GAP SERPL CALCULATED.3IONS-SCNC: 12 MMOL/L (ref 3–16)
AST SERPL-CCNC: 20 U/L (ref 15–37)
BACTERIA URNS QL MICRO: NORMAL /HPF
BILIRUB SERPL-MCNC: 0.7 MG/DL (ref 0–1)
BILIRUB UR QL STRIP.AUTO: NEGATIVE
BUN SERPL-MCNC: 19 MG/DL (ref 7–20)
CALCIUM SERPL-MCNC: 10.5 MG/DL (ref 8.3–10.6)
CHLORIDE SERPL-SCNC: 95 MMOL/L (ref 99–110)
CHOLEST SERPL-MCNC: 228 MG/DL (ref 0–199)
CLARITY UR: CLEAR
CO2 SERPL-SCNC: 28 MMOL/L (ref 21–32)
COLOR UR: YELLOW
CREAT SERPL-MCNC: 0.8 MG/DL (ref 0.6–1.2)
EPI CELLS #/AREA URNS AUTO: 2 /HPF (ref 0–5)
GFR SERPLBLD CREATININE-BSD FMLA CKD-EPI: 72 ML/MIN/{1.73_M2}
GLUCOSE SERPL-MCNC: 172 MG/DL (ref 70–99)
GLUCOSE UR STRIP.AUTO-MCNC: NEGATIVE MG/DL
HDLC SERPL-MCNC: 85 MG/DL (ref 40–60)
HGB UR QL STRIP.AUTO: NEGATIVE
HYALINE CASTS #/AREA URNS AUTO: 0 /LPF (ref 0–8)
KETONES UR STRIP.AUTO-MCNC: NEGATIVE MG/DL
LDLC SERPL CALC-MCNC: 128 MG/DL
LEUKOCYTE ESTERASE UR QL STRIP.AUTO: ABNORMAL
NITRITE UR QL STRIP.AUTO: NEGATIVE
PH UR STRIP.AUTO: 6.5 [PH] (ref 5–8)
POTASSIUM SERPL-SCNC: 3.9 MMOL/L (ref 3.5–5.1)
PROT SERPL-MCNC: 6.6 G/DL (ref 6.4–8.2)
PROT UR STRIP.AUTO-MCNC: ABNORMAL MG/DL
RBC CLUMPS #/AREA URNS AUTO: 2 /HPF (ref 0–4)
SODIUM SERPL-SCNC: 135 MMOL/L (ref 136–145)
SP GR UR STRIP.AUTO: 1.02 (ref 1–1.03)
TRIGL SERPL-MCNC: 77 MG/DL (ref 0–150)
TSH SERPL DL<=0.005 MIU/L-ACNC: 1.4 UIU/ML (ref 0.27–4.2)
UA DIPSTICK W REFLEX MICRO PNL UR: YES
URN SPEC COLLECT METH UR: ABNORMAL
UROBILINOGEN UR STRIP-ACNC: 1 E.U./DL
VLDLC SERPL CALC-MCNC: 15 MG/DL
WBC #/AREA URNS AUTO: 2 /HPF (ref 0–5)

## 2024-12-04 ASSESSMENT — PATIENT HEALTH QUESTIONNAIRE - PHQ9
1. LITTLE INTEREST OR PLEASURE IN DOING THINGS: NOT AT ALL
2. FEELING DOWN, DEPRESSED OR HOPELESS: NOT AT ALL
SUM OF ALL RESPONSES TO PHQ QUESTIONS 1-9: 0
SUM OF ALL RESPONSES TO PHQ9 QUESTIONS 1 & 2: 0
SUM OF ALL RESPONSES TO PHQ QUESTIONS 1-9: 0

## 2024-12-04 ASSESSMENT — LIFESTYLE VARIABLES: HOW OFTEN DO YOU HAVE A DRINK CONTAINING ALCOHOL: NEVER

## 2024-12-04 NOTE — PROGRESS NOTES
Medicare Annual Wellness Visit    Vasu Bates is here for Medicare AWV    No follow-ups on file.     Subjective       Patient's complete Health Risk Assessment and screening values have been reviewed and are found in Flowsheets. The following problems were reviewed today and where indicated follow up appointments were made and/or referrals ordered.    Positive Risk Factor Screenings with Interventions:                Abnormal BMI (obese):  Body mass index is 31.18 kg/m². (!) Abnormal    Interventions:  Patient declines any further evaluation or treatment         Hearing Screen:  Do you or your family notice any trouble with your hearing that hasn't been managed with hearing aids?: (!) Yes    Interventions:  Patient declines any further evaluation or treatment                     Objective   Vitals:    12/04/24 1057   BP: (!) 142/90   Weight: 79.8 kg (176 lb)   Height: 1.6 m (5' 3\")      Body mass index is 31.18 kg/m².                  No Known Allergies  Prior to Visit Medications    Medication Sig Taking? Authorizing Provider   amLODIPine (NORVASC) 5 MG tablet Take 1 tablet by mouth once daily Yes Juventino Pop MD   atorvastatin (LIPITOR) 20 MG tablet Take 1 tablet by mouth daily Yes Juventino Pop MD   ibuprofen (ADVIL;MOTRIN) 800 MG tablet TAKE 1 TABLET BY MOUTH EVERY 6 HOURS AS NEEDED FOR PAIN Yes Juventino Pop MD   Cholecalciferol (VITAMIN D) 50 MCG (2000 UT) CAPS capsule Take 1 capsule by mouth daily Yes Juventino Pop MD   Flaxseed, Linseed, (FLAXSEED OIL PO) Take by mouth Yes ProviderNancy MD   valsartan-hydroCHLOROthiazide (DIOVAN-HCT) 320-25 MG per tablet Take 1 tablet by mouth once daily  Juventino Pop MD   metFORMIN (GLUCOPHAGE XR) 500 MG extended release tablet one tablet by mouth twice daily.  Patient not taking: Reported on 8/21/2024  Juventino Ppo MD       [facility] (Including outside providers/suppliers regularly involved in providing care):   Patient Care Team:  Juventino Pop MD as PCP - 
Lipid Panel; Future  - TSH; Future  - Urinalysis; Future  - Vitamin D 25 Hydroxy; Future  - Hemoglobin A1C; Future  - AFL - Dl Keating DO, Gynecologic Oncology, [facility]    6. PE (physical exam), annual  Check screening labs continue diet and exercise referral to GYN for evaluation of uterine fibroids and follow-up in 6 months to get flu and RSV vaccinations at the pharmacy  - CBC with Auto Differential; Future  - Comprehensive Metabolic Panel; Future  - Lipid Panel; Future  - TSH; Future  - Urinalysis; Future  - Vitamin D 25 Hydroxy; Future  - Hemoglobin A1C; Future  - AFL - Dl Keating DO, Gynecologic Oncology, [facility]    7. Vitamin D deficiency  Check above labs  - Vitamin D 25 Hydroxy; Future

## 2024-12-05 LAB
BASOPHILS # BLD: 0 K/UL (ref 0–0.2)
BASOPHILS NFR BLD: 0 %
DEPRECATED RDW RBC AUTO: 13.8 % (ref 12.4–15.4)
EOSINOPHIL # BLD: 0.1 K/UL (ref 0–0.6)
EOSINOPHIL NFR BLD: 1 %
EST. AVERAGE GLUCOSE BLD GHB EST-MCNC: 185.8 MG/DL
HBA1C MFR BLD: 8.1 %
HCT VFR BLD AUTO: 43.4 % (ref 36–48)
HGB BLD-MCNC: 14.4 G/DL (ref 12–16)
LYMPHOCYTES # BLD: 1.3 K/UL (ref 1–5.1)
LYMPHOCYTES NFR BLD: 17 %
MCH RBC QN AUTO: 31.2 PG (ref 26–34)
MCHC RBC AUTO-ENTMCNC: 33.3 G/DL (ref 31–36)
MCV RBC AUTO: 93.8 FL (ref 80–100)
MONOCYTES # BLD: 0.6 K/UL (ref 0–1.3)
MONOCYTES NFR BLD: 9 %
NEUTROPHILS # BLD: 5.1 K/UL (ref 1.7–7.7)
NEUTROPHILS NFR BLD: 72 %
PLATELET # BLD AUTO: 249 K/UL (ref 135–450)
PMV BLD AUTO: 8.7 FL (ref 5–10.5)
RBC # BLD AUTO: 4.63 M/UL (ref 4–5.2)
RBC MORPH BLD: NORMAL
SLIDE REVIEW: NORMAL
VARIANT LYMPHS NFR BLD MANUAL: 1 % (ref 0–6)
WBC # BLD AUTO: 7.1 K/UL (ref 4–11)

## 2024-12-05 RX ORDER — METFORMIN HYDROCHLORIDE 500 MG/1
TABLET, EXTENDED RELEASE ORAL
Qty: 60 TABLET | Refills: 5 | Status: SHIPPED | OUTPATIENT
Start: 2024-12-05

## 2024-12-05 NOTE — TELEPHONE ENCOUNTER
Pt needs medication refill below.    [phone]  Pls call and advise      metFORMIN (GLUCOPHAGE XR) 500 MG extended release tablet [[id number]]  DISCONTINUED     [facility] [address] [place] [address] - P [phone] - F [phone]

## 2024-12-06 ENCOUNTER — TELEPHONE (OUTPATIENT)
Dept: INTERNAL MEDICINE CLINIC | Age: 84
End: 2024-12-06

## 2024-12-06 NOTE — TELEPHONE ENCOUNTER
[facility] pharmacy called they received two scripts for the pt's metformin, asking for clarification on which one they need to fill.

## 2024-12-10 NOTE — TELEPHONE ENCOUNTER
Pharmacy needs to know what prescription is to be filled.  One is a 1000 mg and the other is 500 mg.

## 2024-12-10 NOTE — TELEPHONE ENCOUNTER
Pharmacy called again to find the answer. Consulted with Loreta who told me it is 1000 mg twice daily.

## 2024-12-30 ENCOUNTER — TELEPHONE (OUTPATIENT)
Dept: INTERNAL MEDICINE CLINIC | Age: 84
End: 2024-12-30

## 2024-12-30 ENCOUNTER — PATIENT MESSAGE (OUTPATIENT)
Dept: INTERNAL MEDICINE CLINIC | Age: 84
End: 2024-12-30

## 2024-12-30 NOTE — TELEPHONE ENCOUNTER
Meformin  (Newest Message First)  View All Conversations on this Encounter  Vasu SNYDER [facility] (supporting Juventino Pop MD)2 hours ago (12:01 PM)     MB  Metformin taken twice day is causing cramps and diarrhea.  Please advise.

## 2025-03-07 DIAGNOSIS — E78.00 PURE HYPERCHOLESTEROLEMIA: ICD-10-CM

## 2025-03-07 RX ORDER — ATORVASTATIN CALCIUM 20 MG/1
20 TABLET, FILM COATED ORAL DAILY
Qty: 90 TABLET | Refills: 0 | Status: SHIPPED | OUTPATIENT
Start: 2025-03-07

## 2025-05-23 RX ORDER — AZITHROMYCIN 250 MG/1
TABLET, FILM COATED ORAL
Qty: 6 TABLET | Refills: 0 | Status: SHIPPED | OUTPATIENT
Start: 2025-05-23 | End: 2025-06-02

## 2025-05-23 NOTE — TELEPHONE ENCOUNTER
Pt has a cough started this week, little phlegm still clear  Requesting medication     [facility] [address] [place] [address] - P [phone] - F [phone]

## 2025-08-07 DIAGNOSIS — E78.00 PURE HYPERCHOLESTEROLEMIA: ICD-10-CM

## 2025-08-08 RX ORDER — VALSARTAN AND HYDROCHLOROTHIAZIDE 320; 25 MG/1; MG/1
1 TABLET, FILM COATED ORAL DAILY
Qty: 30 TABLET | Refills: 0 | Status: SHIPPED | OUTPATIENT
Start: 2025-08-08

## 2025-08-08 RX ORDER — ATORVASTATIN CALCIUM 20 MG/1
20 TABLET, FILM COATED ORAL DAILY
Qty: 30 TABLET | Refills: 0 | Status: SHIPPED | OUTPATIENT
Start: 2025-08-08

## 2025-08-18 RX ORDER — AMLODIPINE BESYLATE 5 MG/1
5 TABLET ORAL DAILY
Qty: 90 TABLET | Refills: 0 | Status: SHIPPED | OUTPATIENT
Start: 2025-08-18

## 2025-08-22 DIAGNOSIS — E78.00 PURE HYPERCHOLESTEROLEMIA: ICD-10-CM

## 2025-08-25 RX ORDER — ATORVASTATIN CALCIUM 20 MG/1
20 TABLET, FILM COATED ORAL DAILY
Qty: 15 TABLET | Refills: 0 | Status: SHIPPED | OUTPATIENT
Start: 2025-08-25

## 2025-08-25 RX ORDER — VALSARTAN AND HYDROCHLOROTHIAZIDE 320; 25 MG/1; MG/1
1 TABLET, FILM COATED ORAL DAILY
Qty: 15 TABLET | Refills: 0 | Status: SHIPPED | OUTPATIENT
Start: 2025-08-25